# Patient Record
Sex: FEMALE | Race: WHITE | NOT HISPANIC OR LATINO | Employment: STUDENT | URBAN - METROPOLITAN AREA
[De-identification: names, ages, dates, MRNs, and addresses within clinical notes are randomized per-mention and may not be internally consistent; named-entity substitution may affect disease eponyms.]

---
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
## 2019-01-10 NOTE — TELEPHONE ENCOUNTER
Spoke to [facility] at [facility] - patient has a  In [facility] and they signed a order for her.

## 2019-01-10 NOTE — TELEPHONE ENCOUNTER
Patient's mom is Harassing [facility] in getting her the Alpha 1 treatment. We have not seen the patient since 10/2017. I have advised [place] of this but it seems like she is not following up with any provider at this point. I have not called patient or her mother at this point. Would you be willing to sign for 1 or 2 doses of Prolastin and I will let her know that she needs to establish with either new provider or come into see us.   Please Advise

## 2019-02-06 ENCOUNTER — OFFICE VISIT (OUTPATIENT)
Dept: URGENT CARE | Facility: CLINIC | Age: 19
End: 2019-02-06
Payer: COMMERCIAL

## 2019-02-06 ENCOUNTER — HOSPITAL ENCOUNTER (EMERGENCY)
Facility: MEDICAL CENTER | Age: 19
End: 2019-02-07
Attending: EMERGENCY MEDICINE
Payer: COMMERCIAL

## 2019-02-06 VITALS
SYSTOLIC BLOOD PRESSURE: 106 MMHG | WEIGHT: 117 LBS | OXYGEN SATURATION: 97 % | HEIGHT: 60 IN | DIASTOLIC BLOOD PRESSURE: 70 MMHG | HEART RATE: 80 BPM | BODY MASS INDEX: 22.97 KG/M2 | RESPIRATION RATE: 16 BRPM | TEMPERATURE: 97.6 F

## 2019-02-06 DIAGNOSIS — L23.3 ALLERGIC CONTACT DERMATITIS DUE TO DRUGS IN CONTACT WITH SKIN: ICD-10-CM

## 2019-02-06 DIAGNOSIS — L02.02 BOIL, FACE: ICD-10-CM

## 2019-02-06 PROCEDURE — 99203 OFFICE O/P NEW LOW 30 MIN: CPT | Performed by: FAMILY MEDICINE

## 2019-02-06 PROCEDURE — 99283 EMERGENCY DEPT VISIT LOW MDM: CPT

## 2019-02-06 RX ORDER — DOXYCYCLINE HYCLATE 100 MG
100 TABLET ORAL EVERY 12 HOURS
Qty: 14 TAB | Refills: 0 | Status: SHIPPED | OUTPATIENT
Start: 2019-02-06 | End: 2019-02-07 | Stop reason: CLARIF

## 2019-02-06 RX ORDER — CLOBETASOL PROPIONATE 0.5 MG/G
OINTMENT TOPICAL
Qty: 15 G | Refills: 0 | Status: SHIPPED | OUTPATIENT
Start: 2019-02-06 | End: 2019-02-07 | Stop reason: CLARIF

## 2019-02-06 RX ORDER — IBUPROFEN 800 MG/1
800 TABLET ORAL EVERY 8 HOURS PRN
Qty: 20 TAB | Refills: 3 | Status: SHIPPED | OUTPATIENT
Start: 2019-02-06 | End: 2019-02-07 | Stop reason: CLARIF

## 2019-02-06 ASSESSMENT — ENCOUNTER SYMPTOMS
CHILLS: 0
DIZZINESS: 0
SHORTNESS OF BREATH: 0
ORTHOPNEA: 0
FEVER: 0
HEMOPTYSIS: 0
FOCAL WEAKNESS: 0

## 2019-02-06 NOTE — PROGRESS NOTES
"Subjective:      Joan Jesus is a 19 y.o. female who presents with Nodule (x 4 days, bump on Rt. side of forehead, redness and swelling)      - This is a very pleasant, well and non-toxic appearing 19 y.o. female with complaints of tender pimple Rt side forehead x 4 days. Using otc cortisone and tried to pop squeeze it but not helping. No NVFC          ALLERGIES:  Iodine; Sulfa drugs; Fentanyl; Cefuroxime; Clindamycin; Morphine; Other food; Other misc; Pcn [penicillins]; and Prednisone     PMH:  Past Medical History:   Diagnosis Date   • Alpha-1-antitrypsin deficiency (HCC)    • Asthma    • Dental disorder     permanent retainer lower    • Food allergy     \"jalepenos make my lips and face swell\"   • Gynecological disorder     ovarian cysts   • Heart burn     resolved   • Hypoglycemia    • Seasonal allergies         PSH:  Past Surgical History:   Procedure Laterality Date   • TONSILLECTOMY AND ADENOIDECTOMY N/A 5/25/2018    Procedure: TONSILLECTOMY AND ADENOIDECTOMY;  Surgeon: Luiza Callahan M.D.;  Location: [facility];  Service: Ent   • ANTROSTOMY Bilateral 3/25/2016    Procedure: ANTROSTOMY - ENDOSCOPIC MAXILLARY W/FRONTAL SINUS EXPLORATION;  Surgeon: Luiza Callahan M.D.;  Location: [facility];  Service:    • ETHMOIDECTOMY Bilateral 3/25/2016    Procedure: ETHMOIDECTOMY - ENDOSCOPIC ;  Surgeon: Luiza Callahan M.D.;  Location: [facility];  Service:    • SPHENOIDECTOMY Bilateral 3/25/2016    Procedure: SPHENOIDOTOMY;  Surgeon: Luiza Callahan M.D.;  Location: [facility];  Service:    • TURBINOPLASTY Bilateral 3/25/2016    Procedure: TURBINOPLASTY;  Surgeon: Luiza Callahan M.D.;  Location: [facility];  Service:    • APPENDECTOMY CHILD  2013   • INGUINAL HERNIA REPAIR  2004       MEDS:    Current Outpatient Prescriptions:   •  doxycycline (VIBRAMYCIN) 100 MG Tab, Take 1 Tab by mouth every 12 hours for 7 days., Disp: 14 " Tab, Rfl: 0  •  clobetasol (TEMOVATE) 0.05 % Ointment, BID x 3 days, Disp: 15 g, Rfl: 0  •  ibuprofen (MOTRIN) 800 MG Tab, Take 1 Tab by mouth every 8 hours as needed., Disp: 20 Tab, Rfl: 3  •  etonogestrel (NEXPLANON) 68 MG Implant implant, Inject 1 Each as instructed Once., Disp: , Rfl:   •  ipratropium (ATROVENT) 17 MCG/ACT Aero Soln, Inhale 2 Puffs by mouth every 6 hours. (Patient taking differently: Inhale 2 Puffs by mouth as needed.), Disp: 17 g, Rfl: 3  •  budesonide-formoterol (SYMBICORT) 160-4.5 MCG/ACT Aerosol, Inhale 2 Puffs by mouth 2 Times a Day., Disp: , Rfl:   •  montelukast (SINGULAIR) 10 MG Tab, Take 10 mg by mouth every evening., Disp: , Rfl:   •  cetirizine (ZYRTEC) 10 MG Tab, Take 10 mg by mouth every day., Disp: , Rfl:   •  fluticasone (FLONASE) 50 MCG/ACT nasal spray, Spray 1 Spray in nose as needed., Disp: , Rfl:   •  levalbuterol (XOPENEX) 1.25 MG/3ML Nebu Soln, 1.25 mg by Nebulization route every four hours as needed for Shortness of Breath., Disp: , Rfl:   •  alpha-1-proteinase inhibitor (PROLASTIN) 500 MG Recon Soln, by Intravenous route every 7 days., Disp: , Rfl:     ** I have documented what I find to be significant in regards to past medical, social, family and surgical history  in my HPI or under PMH/PSH/FH review section, otherwise it is contributory **           HPI    Review of Systems   Constitutional: Negative for chills and fever.   Respiratory: Negative for hemoptysis and shortness of breath.    Cardiovascular: Negative for chest pain and orthopnea.   Skin:        Cyst forehead   Neurological: Negative for dizziness and focal weakness.          Objective:     /70   Pulse 80   Temp 36.4 °C (97.6 °F)   Resp 16   Ht 1.524 m (5')   Wt 53.1 kg (117 lb)   SpO2 97%   BMI 22.85 kg/m²      Physical Exam   Constitutional: She appears well-developed. No distress.   HENT:   Head: Normocephalic and atraumatic.   Mouth/Throat: Oropharynx is clear and moist.   Eyes: Conjunctivae  are normal.   Neck: Neck supple.   Cardiovascular: Regular rhythm.    No murmur heard.  Pulmonary/Chest: Effort normal. No respiratory distress.   Neurological: She is alert. She exhibits normal muscle tone.   Skin: Skin is warm and dry.   Red tender raised papule Rt side forehead. No fluctuance or head to rafaela/drain   Psychiatric: She has a normal mood and affect. Judgment normal.   Nursing note and vitals reviewed.              Assessment/Plan:         1. Boil, face  doxycycline (VIBRAMYCIN) 100 MG Tab    clobetasol (TEMOVATE) 0.05 % Ointment    ibuprofen (MOTRIN) 800 MG Tab             Dx & d/c instructions discussed w/ patient and/or family members.     ER precautions (worsening signs symptoms and when to go to ER) discussed.    Follow up w/ PCP in 2-3 days to make sure symptoms improving and no further intervention/treatment and/or work-up needed was advised, ER if feeling worse or not improving in 2 days.    Possible side effects (i.e. Rash, GI upset/constipation, sedation, elevation of BP or sugars) of any medications given discussed.     Patient left in stable condition

## 2019-02-07 ENCOUNTER — HOSPITAL ENCOUNTER (INPATIENT)
Facility: MEDICAL CENTER | Age: 19
LOS: 2 days | DRG: 603 | End: 2019-02-09
Attending: EMERGENCY MEDICINE | Admitting: INTERNAL MEDICINE
Payer: COMMERCIAL

## 2019-02-07 ENCOUNTER — APPOINTMENT (OUTPATIENT)
Dept: RADIOLOGY | Facility: MEDICAL CENTER | Age: 19
DRG: 603 | End: 2019-02-07
Attending: EMERGENCY MEDICINE
Payer: COMMERCIAL

## 2019-02-07 ENCOUNTER — OFFICE VISIT (OUTPATIENT)
Dept: URGENT CARE | Facility: CLINIC | Age: 19
End: 2019-02-07
Payer: COMMERCIAL

## 2019-02-07 VITALS
BODY MASS INDEX: 22.51 KG/M2 | WEIGHT: 114.64 LBS | SYSTOLIC BLOOD PRESSURE: 117 MMHG | DIASTOLIC BLOOD PRESSURE: 74 MMHG | HEART RATE: 87 BPM | HEIGHT: 60 IN | TEMPERATURE: 98.1 F | RESPIRATION RATE: 15 BRPM | OXYGEN SATURATION: 97 %

## 2019-02-07 VITALS
BODY MASS INDEX: 22.58 KG/M2 | WEIGHT: 115 LBS | SYSTOLIC BLOOD PRESSURE: 110 MMHG | HEIGHT: 60 IN | HEART RATE: 72 BPM | DIASTOLIC BLOOD PRESSURE: 64 MMHG | OXYGEN SATURATION: 95 % | RESPIRATION RATE: 16 BRPM | TEMPERATURE: 98.3 F

## 2019-02-07 DIAGNOSIS — T78.40XA ALLERGIC REACTION TO DRUG, INITIAL ENCOUNTER: ICD-10-CM

## 2019-02-07 DIAGNOSIS — L02.91 ABSCESS: ICD-10-CM

## 2019-02-07 PROBLEM — L03.211 FACIAL CELLULITIS: Status: ACTIVE | Noted: 2019-02-07

## 2019-02-07 LAB
ALBUMIN SERPL BCP-MCNC: 4.5 G/DL (ref 3.2–4.9)
ALBUMIN/GLOB SERPL: 1.6 G/DL
ALP SERPL-CCNC: 46 U/L (ref 30–99)
ALT SERPL-CCNC: 11 U/L (ref 2–50)
ANION GAP SERPL CALC-SCNC: 7 MMOL/L (ref 0–11.9)
AST SERPL-CCNC: 14 U/L (ref 12–45)
BASOPHILS # BLD AUTO: 0.3 % (ref 0–1.8)
BASOPHILS # BLD: 0.03 K/UL (ref 0–0.12)
BILIRUB SERPL-MCNC: 0.4 MG/DL (ref 0.1–1.5)
BUN SERPL-MCNC: 13 MG/DL (ref 8–22)
CALCIUM SERPL-MCNC: 9.7 MG/DL (ref 8.5–10.5)
CHLORIDE SERPL-SCNC: 108 MMOL/L (ref 96–112)
CO2 SERPL-SCNC: 22 MMOL/L (ref 20–33)
CREAT SERPL-MCNC: 0.81 MG/DL (ref 0.5–1.4)
EOSINOPHIL # BLD AUTO: 0.1 K/UL (ref 0–0.51)
EOSINOPHIL NFR BLD: 1.1 % (ref 0–6.9)
ERYTHROCYTE [DISTWIDTH] IN BLOOD BY AUTOMATED COUNT: 43.4 FL (ref 35.9–50)
GLOBULIN SER CALC-MCNC: 2.8 G/DL (ref 1.9–3.5)
GLUCOSE SERPL-MCNC: 88 MG/DL (ref 65–99)
HCT VFR BLD AUTO: 44.3 % (ref 37–47)
HGB BLD-MCNC: 14.9 G/DL (ref 12–16)
IMM GRANULOCYTES # BLD AUTO: 0.02 K/UL (ref 0–0.11)
IMM GRANULOCYTES NFR BLD AUTO: 0.2 % (ref 0–0.9)
LACTATE BLD-SCNC: 0.9 MMOL/L (ref 0.5–2)
LYMPHOCYTES # BLD AUTO: 2.2 K/UL (ref 1–4.8)
LYMPHOCYTES NFR BLD: 24.5 % (ref 22–41)
MCH RBC QN AUTO: 30.5 PG (ref 27–33)
MCHC RBC AUTO-ENTMCNC: 33.6 G/DL (ref 33.6–35)
MCV RBC AUTO: 90.8 FL (ref 81.4–97.8)
MONOCYTES # BLD AUTO: 0.57 K/UL (ref 0–0.85)
MONOCYTES NFR BLD AUTO: 6.4 % (ref 0–13.4)
NEUTROPHILS # BLD AUTO: 6.05 K/UL (ref 2–7.15)
NEUTROPHILS NFR BLD: 67.5 % (ref 44–72)
NRBC # BLD AUTO: 0 K/UL
NRBC BLD-RTO: 0 /100 WBC
PLATELET # BLD AUTO: 233 K/UL (ref 164–446)
PMV BLD AUTO: 11.1 FL (ref 9–12.9)
POTASSIUM SERPL-SCNC: 3.8 MMOL/L (ref 3.6–5.5)
PROT SERPL-MCNC: 7.3 G/DL (ref 6–8.2)
RBC # BLD AUTO: 4.88 M/UL (ref 4.2–5.4)
SODIUM SERPL-SCNC: 137 MMOL/L (ref 135–145)
WBC # BLD AUTO: 9 K/UL (ref 4.8–10.8)

## 2019-02-07 PROCEDURE — 80053 COMPREHEN METABOLIC PANEL: CPT

## 2019-02-07 PROCEDURE — 87040 BLOOD CULTURE FOR BACTERIA: CPT

## 2019-02-07 PROCEDURE — 0H91XZZ DRAINAGE OF FACE SKIN, EXTERNAL APPROACH: ICD-10-PCS | Performed by: SURGERY

## 2019-02-07 PROCEDURE — 700111 HCHG RX REV CODE 636 W/ 250 OVERRIDE (IP): Performed by: HOSPITALIST

## 2019-02-07 PROCEDURE — 700111 HCHG RX REV CODE 636 W/ 250 OVERRIDE (IP): Performed by: EMERGENCY MEDICINE

## 2019-02-07 PROCEDURE — 99223 1ST HOSP IP/OBS HIGH 75: CPT | Performed by: INTERNAL MEDICINE

## 2019-02-07 PROCEDURE — 770006 HCHG ROOM/CARE - MED/SURG/GYN SEMI*

## 2019-02-07 PROCEDURE — 83605 ASSAY OF LACTIC ACID: CPT

## 2019-02-07 PROCEDURE — 76882 US LMTD JT/FCL EVL NVASC XTR: CPT | Mod: RT

## 2019-02-07 PROCEDURE — 700102 HCHG RX REV CODE 250 W/ 637 OVERRIDE(OP): Performed by: INTERNAL MEDICINE

## 2019-02-07 PROCEDURE — 700105 HCHG RX REV CODE 258: Performed by: EMERGENCY MEDICINE

## 2019-02-07 PROCEDURE — 96365 THER/PROPH/DIAG IV INF INIT: CPT

## 2019-02-07 PROCEDURE — A9270 NON-COVERED ITEM OR SERVICE: HCPCS | Performed by: INTERNAL MEDICINE

## 2019-02-07 PROCEDURE — 36415 COLL VENOUS BLD VENIPUNCTURE: CPT

## 2019-02-07 PROCEDURE — 99214 OFFICE O/P EST MOD 30 MIN: CPT | Performed by: NURSE PRACTITIONER

## 2019-02-07 PROCEDURE — 99285 EMERGENCY DEPT VISIT HI MDM: CPT

## 2019-02-07 PROCEDURE — A9270 NON-COVERED ITEM OR SERVICE: HCPCS | Performed by: EMERGENCY MEDICINE

## 2019-02-07 PROCEDURE — 96375 TX/PRO/DX INJ NEW DRUG ADDON: CPT

## 2019-02-07 PROCEDURE — 700105 HCHG RX REV CODE 258: Performed by: INTERNAL MEDICINE

## 2019-02-07 PROCEDURE — 85025 COMPLETE CBC W/AUTO DIFF WBC: CPT

## 2019-02-07 PROCEDURE — 700111 HCHG RX REV CODE 636 W/ 250 OVERRIDE (IP): Performed by: INTERNAL MEDICINE

## 2019-02-07 PROCEDURE — 700102 HCHG RX REV CODE 250 W/ 637 OVERRIDE(OP): Performed by: EMERGENCY MEDICINE

## 2019-02-07 RX ORDER — AMOXICILLIN 250 MG
2 CAPSULE ORAL 2 TIMES DAILY
Status: DISCONTINUED | OUTPATIENT
Start: 2019-02-08 | End: 2019-02-09 | Stop reason: HOSPADM

## 2019-02-07 RX ORDER — DIPHENHYDRAMINE HCL 25 MG
25 TABLET ORAL EVERY 8 HOURS
Status: DISCONTINUED | OUTPATIENT
Start: 2019-02-07 | End: 2019-02-09 | Stop reason: HOSPADM

## 2019-02-07 RX ORDER — ACETAMINOPHEN 325 MG/1
650 TABLET ORAL EVERY 6 HOURS PRN
Status: DISCONTINUED | OUTPATIENT
Start: 2019-02-07 | End: 2019-02-09 | Stop reason: HOSPADM

## 2019-02-07 RX ORDER — BISACODYL 10 MG
10 SUPPOSITORY, RECTAL RECTAL
Status: DISCONTINUED | OUTPATIENT
Start: 2019-02-07 | End: 2019-02-09 | Stop reason: HOSPADM

## 2019-02-07 RX ORDER — CETIRIZINE HYDROCHLORIDE 10 MG/1
10 TABLET ORAL 2 TIMES DAILY
Status: DISCONTINUED | OUTPATIENT
Start: 2019-02-07 | End: 2019-02-09 | Stop reason: HOSPADM

## 2019-02-07 RX ORDER — POLYETHYLENE GLYCOL 3350 17 G/17G
1 POWDER, FOR SOLUTION ORAL
Status: DISCONTINUED | OUTPATIENT
Start: 2019-02-07 | End: 2019-02-09 | Stop reason: HOSPADM

## 2019-02-07 RX ORDER — DIPHENHYDRAMINE HYDROCHLORIDE 50 MG/ML
25 INJECTION INTRAMUSCULAR; INTRAVENOUS ONCE
Status: COMPLETED | OUTPATIENT
Start: 2019-02-07 | End: 2019-02-07

## 2019-02-07 RX ORDER — FLUTICASONE PROPIONATE 50 MCG
1 SPRAY, SUSPENSION (ML) NASAL 2 TIMES DAILY PRN
Status: DISCONTINUED | OUTPATIENT
Start: 2019-02-07 | End: 2019-02-09 | Stop reason: HOSPADM

## 2019-02-07 RX ORDER — BUDESONIDE AND FORMOTEROL FUMARATE DIHYDRATE 160; 4.5 UG/1; UG/1
2 AEROSOL RESPIRATORY (INHALATION) 2 TIMES DAILY
Status: DISCONTINUED | OUTPATIENT
Start: 2019-02-07 | End: 2019-02-09 | Stop reason: HOSPADM

## 2019-02-07 RX ORDER — DIPHENHYDRAMINE HCL 25 MG
25 TABLET ORAL
COMMUNITY
End: 2019-10-17

## 2019-02-07 RX ORDER — MOXIFLOXACIN HYDROCHLORIDE 400 MG/1
400 TABLET ORAL DAILY
Qty: 7 TAB | Refills: 0 | Status: SHIPPED | OUTPATIENT
Start: 2019-02-07 | End: 2019-02-07 | Stop reason: CLARIF

## 2019-02-07 RX ORDER — IBUPROFEN 600 MG/1
600 TABLET ORAL ONCE
Status: COMPLETED | OUTPATIENT
Start: 2019-02-07 | End: 2019-02-07

## 2019-02-07 RX ORDER — DIPHENHYDRAMINE HCL 25 MG
25 TABLET ORAL
Status: DISCONTINUED | OUTPATIENT
Start: 2019-02-07 | End: 2019-02-09 | Stop reason: HOSPADM

## 2019-02-07 RX ORDER — MONTELUKAST SODIUM 10 MG/1
10 TABLET ORAL EVERY EVENING
Status: DISCONTINUED | OUTPATIENT
Start: 2019-02-07 | End: 2019-02-09 | Stop reason: HOSPADM

## 2019-02-07 RX ORDER — LEVALBUTEROL INHALATION SOLUTION 1.25 MG/3ML
1.25 SOLUTION RESPIRATORY (INHALATION) EVERY 4 HOURS PRN
Status: DISCONTINUED | OUTPATIENT
Start: 2019-02-07 | End: 2019-02-09 | Stop reason: HOSPADM

## 2019-02-07 RX ORDER — DIPHENHYDRAMINE HYDROCHLORIDE 50 MG/ML
25 INJECTION INTRAMUSCULAR; INTRAVENOUS EVERY 8 HOURS
Status: DISCONTINUED | OUTPATIENT
Start: 2019-02-07 | End: 2019-02-07

## 2019-02-07 RX ORDER — DOXYCYCLINE HYCLATE 100 MG
100 TABLET ORAL EVERY 12 HOURS
Status: ON HOLD | COMMUNITY
Start: 2019-02-06 | End: 2019-02-09

## 2019-02-07 RX ADMIN — VANCOMYCIN HYDROCHLORIDE 1300 MG: 100 INJECTION, POWDER, LYOPHILIZED, FOR SOLUTION INTRAVENOUS at 15:55

## 2019-02-07 RX ADMIN — VANCOMYCIN HYDROCHLORIDE 1300 MG: 100 INJECTION, POWDER, LYOPHILIZED, FOR SOLUTION INTRAVENOUS at 22:03

## 2019-02-07 RX ADMIN — MONTELUKAST SODIUM 10 MG: 10 TABLET, FILM COATED ORAL at 22:00

## 2019-02-07 RX ADMIN — DIPHENHYDRAMINE HYDROCHLORIDE 25 MG: 50 INJECTION INTRAMUSCULAR; INTRAVENOUS at 16:50

## 2019-02-07 RX ADMIN — DIPHENHYDRAMINE HYDROCHLORIDE 25 MG: 50 INJECTION INTRAMUSCULAR; INTRAVENOUS at 22:01

## 2019-02-07 RX ADMIN — IBUPROFEN 600 MG: 600 TABLET ORAL at 16:01

## 2019-02-07 ASSESSMENT — ENCOUNTER SYMPTOMS
HEARTBURN: 0
DIZZINESS: 0
CONSTIPATION: 0
HEADACHES: 0
ABDOMINAL PAIN: 0
FALLS: 0
FEVER: 0
FEVER: 0
EYE PAIN: 0
TINGLING: 0
SHORTNESS OF BREATH: 0
SPEECH CHANGE: 0
TREMORS: 0
CHILLS: 0
WEIGHT LOSS: 0
SEIZURES: 0
CHILLS: 0
WHEEZING: 0
NAUSEA: 0
NECK PAIN: 0
SENSORY CHANGE: 0
DEPRESSION: 0
NAUSEA: 0
SHORTNESS OF BREATH: 0
WHEEZING: 0
VOMITING: 0
WEAKNESS: 0
COUGH: 0
SPUTUM PRODUCTION: 0
DIARRHEA: 0
BLURRED VISION: 0
PND: 0
VOMITING: 0
PALPITATIONS: 0
BACK PAIN: 0

## 2019-02-07 ASSESSMENT — LIFESTYLE VARIABLES
SUBSTANCE_ABUSE: 0
EVER_SMOKED: NEVER

## 2019-02-07 ASSESSMENT — PATIENT HEALTH QUESTIONNAIRE - PHQ9
2. FEELING DOWN, DEPRESSED, IRRITABLE, OR HOPELESS: NOT AT ALL
SUM OF ALL RESPONSES TO PHQ9 QUESTIONS 1 AND 2: 0
1. LITTLE INTEREST OR PLEASURE IN DOING THINGS: NOT AT ALL

## 2019-02-07 NOTE — ED TRIAGE NOTES
Pt ambulatory to triage. Pt seen here last night for the same. Pt states she was told to take benadryl and sent home. When pt woke up her eyes were swollen shut and facial swelling hadnt improved. Pt went to Dr. Cummings (Asthma and Allergy specialist) this morning and was told to come here for generalized skin infection. Pt has abscess above right eye and MD wants f/u imaging. Pt states she has had right eye visual changes and has a headache.     Chief Complaint   Patient presents with   • Wound Infection   • Sent by MD   • Blurred Vision     [facility] process. Pt educated to notified RN or triage tech if changes in condition.

## 2019-02-07 NOTE — ED NOTES
Pt ambulated from waiting room without difficulty, connected to monitor, PIV established and blood sent to lab, agree with triage note. ERP at bedside for eval

## 2019-02-07 NOTE — ED PROVIDER NOTES
"ED Provider Note    ED Provider Note      Primary care provider: Marcos Dunlap M.D.    CHIEF COMPLAINT  Chief Complaint   Patient presents with   • Facial Swelling     \"I developed a cyst on my forehead, they gave me a cream for it which I started this afternoon (clobetasol), I washed it off around 1900 but my face has continued to swell.\" Swelling noted to forehead, bridge of nose, and bilateral eyelids.        HPI  Joan Jesus is a 19 y.o. female who presents to the Emergency Department with chief complaint of facial swelling.  Patient was seen in urgent treatment center earlier in the day her head.  She was diagnosed with a cyst was placed on doxycycline and clobetasol.  Patient stated that after using the clobetasol she noticed immediate swelling over her forehead and some swelling into bilateral eyes.  She has had no shortness of breath no difficulty swallowing breathing no other changes in her skin no other acute symptoms or concerns.    REVIEW OF SYSTEMS  Positive for mild facial swelling negative for any pruritus shortness of breath difficulty swallowing or breathing    PAST MEDICAL HISTORY   has a past medical history of Alpha-1-antitrypsin deficiency (HCC); Asthma; Dental disorder; Food allergy; Gynecological disorder; Heart burn; Hypoglycemia; and Seasonal allergies.    SURGICAL HISTORY   has a past surgical history that includes inguinal hernia repair (2004); appendectomy child (2013); antrostomy (Bilateral, 3/25/2016); ethmoidectomy (Bilateral, 3/25/2016); sphenoidectomy (Bilateral, 3/25/2016); turbinoplasty (Bilateral, 3/25/2016); and tonsillectomy and adenoidectomy (N/A, 5/25/2018).    SOCIAL HISTORY  Social History   Substance Use Topics   • Smoking status: Never Smoker   • Smokeless tobacco: Never Used   • Alcohol use No      History   Drug Use No       FAMILY HISTORY  Non-Contributory    CURRENT MEDICATIONS  Home Medications    **Home medications have not yet been reviewed for this " encounter**         ALLERGIES  Allergies   Allergen Reactions   • Iodine Anaphylaxis   • Sulfa Drugs Anaphylaxis   • Fentanyl Vomiting   • Cefuroxime Hives   • Clindamycin Hives   • Morphine Nausea     Severe nausea   • Other Food      Gluten intolerance   • Other Misc Rash     Electrode pads   • Pcn [Penicillins] Hives   • Prednisone Hives       PHYSICAL EXAM  VITAL SIGNS: /74   Pulse 88   Temp 36.7 °C (98.1 °F) (Temporal)   Resp 16   Ht 1.524 m (5')   Wt 52 kg (114 lb 10.2 oz)   SpO2 99%   BMI 22.39 kg/m²   Pulse ox interpretation: I interpret this pulse ox as normal.  Constitutional: Alert and oriented x 3, minimal distress  HEENT: Atraumatic moderate edema over the right forehead.  Essentially in the edema patient has 1 cm erythematous indurated lesion with slight central skin breakdown and evidence of spontaneous drainage no fluctuance pupils are equal round reactive to light extraocular movements are intact. The nares is clear, external ears are normal, mouth shows moist mucous membranes  Neck: Supple, no JVD no tracheal deviation  Cardiovascular: Regular rate and rhythm no murmur rub or gallop 2+ pulses peripherally x4  Thorax & Lungs: No respiratory distress, no wheezes rales or rhonchi, No chest tenderness.   GI: Soft nontender nondistended positive bowel sounds, no peritoneal signs  Skin: Warm dry no acute rash or lesion  Musculoskeletal: Moving all extremities with full range and 5 of 5 strength, no acute  deformity  Neurologic: Cranial nerves III through XII are grossly intact, no sensory deficit, no cerebellar dysfunction   Psychiatric: Appropriate affect for situation at this time      DIAGNOSTIC STUDIES / PROCEDURES    COURSE & MEDICAL DECISION MAKING  Pertinent Labs & Imaging studies reviewed. (See chart for details)    11:34 PM - Patient seen and examined at bedside.  Patient appears to have very localized reaction to topical clobetasol.  Patient given instructions to discontinue use of  this med to continue to use her doxycycline as I feels of the lesion on her head is most likely a small abscess that is now spontaneously drained.  Return for any further swelling any shortness breath difficulty swallowing breathing other skin changes any other acute symptoms or concerns otherwise discharged in stable condition.        Patient noted to have slightly elevated blood pressure likely circumstantial secondary to presenting complaint. Referred to primary care physician for further evaluation.        /74   Pulse 88   Temp 36.7 °C (98.1 °F) (Temporal)   Resp 16   Ht 1.524 m (5')   Wt 52 kg (114 lb 10.2 oz)   SpO2 99%   BMI 22.39 kg/m²     Marcos Dunlap M.D.  [address] [facility] [id number] [phone]    In 2 days      [facility], Emergency Dept  [facility] [facility] [address] [phone]    If symptoms worsen      Discharge Medication List as of 2/6/2019 11:42 PM          FINAL IMPRESSION  1. Allergic contact dermatitis due to drugs in contact with skin        This dictation has been created using voice recognition software and/or scribes. The accuracy of the dictation is limited by the abilities of the software and the expertise of the scribes. I expect there may be some errors of grammar and possibly content. I made every attempt to manually correct the errors within my dictation. However, errors related to voice recognition software and/or scribes may still exist and should be interpreted within the appropriate context.

## 2019-02-07 NOTE — ED TRIAGE NOTES
".  Chief Complaint   Patient presents with   • Wound Infection   • Sent by MD   • Blurred Vision   Agree with triage assessment.  Onset Sunday, \" kept getting bigger, my forehead is throbbing in pain. \"   + forehead erythema/edema noted.  Pt reports taking Doxycycline and Benadryl yesterday.  No difficulty breathing.      "

## 2019-02-07 NOTE — ED PROVIDER NOTES
ED Provider Note    Scribed for Justin Murphy M.D. by Maggie Varma. 2/7/2019, 2:04 PM.    Primary care provider: Marcos Dunlap M.D.  Means of arrival: Walk-In  History obtained from: Patient  History limited by: None    CHIEF COMPLAINT  Chief Complaint   Patient presents with   • Wound Infection   • Sent by MD   • Blurred Vision     HPI  Joan Jesus is a 19 y.o. female who presents to the Emergency Department complaining of right sided facial swelling onset 1 day ago. She has an abscess to the right side of her face that she has had for the past 5 days that has only progressively gotten worse now causing the left side of her face start to swell. Associated symptoms include purulent drainage, right eye pain, headache, vision changes, chills. She also endorses dizziness yesterday that has since resolved. She was seen at Urgent Care yesterday for these symptoms and was given antibiotics which she has been taking. Patient also saw a nurse practitioner yesterday after urgent care and was told this was an allergic reaction. She saw her allergist this morning who told her this is not an allergic reaction and to be seen by the ED for infection. Denies fever.    REVIEW OF SYSTEMS  Pertinent positives include abscess, vision changes, drainage, facial swelling. Pertinent negatives include no fever.  As above, all other systems reviewed and are negative.   See HPI for further details.     PAST MEDICAL HISTORY   has a past medical history of Alpha-1-antitrypsin deficiency (HCC); Asthma; Dental disorder; Food allergy; Gynecological disorder; Heart burn; Hypoglycemia; and Seasonal allergies.    SURGICAL HISTORY   has a past surgical history that includes inguinal hernia repair (2004); appendectomy child (2013); antrostomy (Bilateral, 3/25/2016); ethmoidectomy (Bilateral, 3/25/2016); sphenoidectomy (Bilateral, 3/25/2016); turbinoplasty (Bilateral, 3/25/2016); and tonsillectomy and adenoidectomy (N/A,  5/25/2018).    SOCIAL HISTORY  Social History   Substance Use Topics   • Smoking status: Never Smoker   • Smokeless tobacco: Never Used   • Alcohol use No      History   Drug Use No       FAMILY HISTORY  Family History   Problem Relation Age of Onset   • Hypertension Mother    • Other Mother    • Asthma Father        CURRENT MEDICATIONS  Home Medications     Reviewed by Corina Rider PhT (Pharmacy Tech) on 02/07/19 at 1452  Med List Status: Complete   Medication Last Dose Status   alpha-1-proteinase inhibitor (PROLASTIN) 500 MG Recon Soln 1/30/2019 Active   budesonide-formoterol (SYMBICORT) 160-4.5 MCG/ACT Aerosol 2/7/2019 Active   cetirizine (ZYRTEC) 10 MG Tab 2/7/2019 Active   diphenhydrAMINE (BENADRYL) 25 MG Tab 2/6/2019 Active   doxycycline (VIBRAMYCIN) 100 MG Tab 2/6/2019 Active   etonogestrel (NEXPLANON) 68 MG Implant implant 10/2018 Active   fluticasone (FLONASE) 50 MCG/ACT nasal spray UNK Active   ipratropium (ATROVENT) 17 MCG/ACT Aero Soln UNK Active   levalbuterol (XOPENEX) 1.25 MG/3ML Nebu Soln UNK Active   montelukast (SINGULAIR) 10 MG Tab 2/5/2019 Active                ALLERGIES  Allergies   Allergen Reactions   • Iodine Anaphylaxis   • Sulfa Drugs Anaphylaxis   • Fentanyl Vomiting   • Cefuroxime Hives   • Clindamycin Hives   • Gluten Meal Diarrhea     Blisters     • Morphine Nausea     Severe nausea   • Other Misc Rash     Electrode pads   • Pcn [Penicillins] Hives   • Prednisone Hives       PHYSICAL EXAM  VITAL SIGNS: /68   Pulse 80   Temp 35.9 °C (96.6 °F) (Temporal)   Resp 16   Ht 1.524 m (5')   Wt 52.2 kg (115 lb 1.3 oz)   SpO2 95%   BMI 22.48 kg/m²   Vitals reviewed.    Constitutional: Alert in no apparent distress.  HENT: Tender lesion to the right forehead with no fluctuance. Erythema and swelling of the face around her eyes.No signs of trauma, Bilateral external ears normal, Nose normal.   Eyes: Pupils are equal and reactive, Conjunctiva normal, Non-icteric.   Neck: Normal  "range of motion, No tenderness, Supple, No stridor.   Lymphatic: No lymphadenopathy noted.   Cardiovascular: Regular rate and rhythm, no murmurs.   Thorax & Lungs: Normal breath sounds, No respiratory distress, No wheezing, No chest tenderness.   Abdomen: Bowel sounds normal, Soft, No tenderness, No peritoneal signs, No masses, No pulsatile masses.   Skin:  Warm, Dry, No rash.   Back: No bony tenderness, No CVA tenderness.   Extremities: Intact distal pulses, No edema, No tenderness, No cyanosis  Musculoskeletal: Good range of motion in all major joints. No tenderness to palpation or major deformities noted.   Neurologic: Alert , Normal motor function, Normal sensory function, No focal deficits noted.   Psychiatric: Affect normal, Judgment normal, Mood normal.       DIAGNOSTIC STUDIES / PROCEDURES    LABS  Labs Reviewed   CBC WITH DIFFERENTIAL   LACTIC ACID   COMP METABOLIC PANEL   BLOOD CULTURE    Narrative:     1 of 2 for Blood Culture x 2 sites order. Per Hospital  Policy: Only change Specimen Src: to \"Line\" if specified by  physician order.   BLOOD CULTURE    Narrative:     2 of 2 blood culture x2  Sites order. Per Hospital Policy:  Only change Specimen Src: to \"Line\" if specified by physician  order.   ESTIMATED GFR      All labs reviewed by me.    RADIOLOGY  US-EXTREMITY NON VASCULAR UNILATERAL RIGHT   Final Result      7 mm sebaceous cyst.        The radiologist's interpretation of all radiological studies have been reviewed by me.    COURSE & MEDICAL DECISION MAKING  Nursing notes, VS, PMSFHx reviewed in chart.  Differential diagnoses include but not limited to: abscess vs cellulitis.      2:32 PM - Patient seen and examined at bedside. Discussed with the patient that this does appear to be an infection. I am concerned for cellulitis. Plan includes to order blood work and US to further evaluate. Further addressed any questions and concerns. Ordered for US-extremity, CBC with differential, CMP, lactic acid, " and blood culture to evaluate.     2:46 PM - Consulted with pharmacy and we agree to treat the patient with Vancomycin.     3:34 PM - Consulted with Dr. Dang (Hospitalist) who accepts the patient for admission.     3:51 PM -I spoke with Dr. Nelson who is on for plastic surgery who will consult on the patient, her ultrasound reveals a 7 mm infected sebaceous cyst.      DISPOSITION:  Patient will be admitted to Dr. Dang (Hospitalist) in fair condition.      FINAL IMPRESSION  Face abscess  Preseptal cellulitis     Maggie CANO (Nicole), am scribing for, and in the presence of, Justin Murphy M.D..    Electronically signed by: Maggie Varma (Nicole), 2/7/2019    IJustin M.D. personally performed the services described in this documentation, as scribed by Maggie Varma in my presence, and it is both accurate and complete. C    The note accurately reflects work and decisions made by me.  Justin Murphy  2/7/2019  3:52 PM

## 2019-02-07 NOTE — ED TRIAGE NOTES
"Joan Jesus  19 y.o. female  Chief Complaint   Patient presents with   • Facial Swelling     \"I developed a cyst on my forehead, they gave me a cream for it which I started this afternoon (clobetasol), I washed it off around 1900 but my face has continued to swell.\" Swelling noted to forehead, bridge of nose, and bilateral eyelids.        Pt amb to triage with steady gait for above complaint. P    Pt is alert and oriented, speaking in full sentences, follows commands and responds appropriately to questions. Resp are even and unlabored. No behavioral indicators of pain.     Pt placed in lobby. Pt educated on triage process. Pt encouraged to alert staff for any changes.    "

## 2019-02-07 NOTE — ED NOTES
Med Rec Updated and Complete per Pt at bedside with permission to do so in front of family/visitor  Allergies Reviewed and Updated    Pt started a 7-day course of Doxycycline 100mg every 12 hours on 02/06/19 Due to End 02/12/19.    Pt gets Prolastin Injections every 7 days either on Wednesday or Fridays depending on the Pt's preference.    Pt had a Nexplanon Implant placed in October 2018.

## 2019-02-07 NOTE — PROGRESS NOTES
Subjective:      Joan Jesus is a 19 y.o. female who presents with Facial Swelling (facial swelling and redness x 1 day after using clobetesol)            HPI New problem. 19 year old female with facial swelling and rash following application of temovate yesterday. She was seen in our clinic for abscess to forehead and rx'd this as well as doxycycline. She did go to ED last night for this and was evaluated and told to take benadryl (allergic to steroids). She denies any shortness of breath with this. States painful. Took benadry with no improvement. She has increased redness and swelling periorbitally.   Iodine; Sulfa drugs; Fentanyl; Cefuroxime; Clindamycin; Morphine; Other food; Other misc; Pcn [penicillins]; and Prednisone  Current Outpatient Prescriptions on File Prior to Visit   Medication Sig Dispense Refill   • doxycycline (VIBRAMYCIN) 100 MG Tab Take 1 Tab by mouth every 12 hours for 7 days. 14 Tab 0   • clobetasol (TEMOVATE) 0.05 % Ointment BID x 3 days 15 g 0   • ibuprofen (MOTRIN) 800 MG Tab Take 1 Tab by mouth every 8 hours as needed. 20 Tab 3   • alpha-1-proteinase inhibitor (PROLASTIN) 500 MG Recon Soln by Intravenous route every 7 days.     • etonogestrel (NEXPLANON) 68 MG Implant implant Inject 1 Each as instructed Once.     • ipratropium (ATROVENT) 17 MCG/ACT Aero Soln Inhale 2 Puffs by mouth every 6 hours. (Patient taking differently: Inhale 2 Puffs by mouth as needed.) 17 g 3   • budesonide-formoterol (SYMBICORT) 160-4.5 MCG/ACT Aerosol Inhale 2 Puffs by mouth 2 Times a Day.     • montelukast (SINGULAIR) 10 MG Tab Take 10 mg by mouth every evening.     • cetirizine (ZYRTEC) 10 MG Tab Take 10 mg by mouth every day.     • fluticasone (FLONASE) 50 MCG/ACT nasal spray Spray 1 Spray in nose as needed.     • levalbuterol (XOPENEX) 1.25 MG/3ML Nebu Soln 1.25 mg by Nebulization route every four hours as needed for Shortness of Breath.       No current facility-administered medications on file  prior to visit.      Social History     Social History   • Marital status: Single     Spouse name: N/A   • Number of children: N/A   • Years of education: N/A     Occupational History   • Not on file.     Social History Main Topics   • Smoking status: Never Smoker   • Smokeless tobacco: Never Used   • Alcohol use No   • Drug use: No   • Sexual activity: Not on file     Other Topics Concern   • Not on file     Social History Narrative   • No narrative on file     family history includes Asthma in her father; Hypertension in her mother; Other in her mother.      Review of Systems   Constitutional: Negative for chills, fever and malaise/fatigue.   Respiratory: Negative for shortness of breath and wheezing.    Gastrointestinal: Negative for nausea and vomiting.   Skin: Positive for itching and rash.   Neurological: Negative for tingling and sensory change.          Objective:     /64 (BP Location: Left arm, Patient Position: Sitting, BP Cuff Size: Adult)   Pulse 72   Temp 36.8 °C (98.3 °F) (Temporal)   Resp 16   Ht 1.524 m (5')   Wt 52.2 kg (115 lb)   SpO2 95%   BMI 22.46 kg/m²      Physical Exam   Constitutional: She is oriented to person, place, and time. She appears well-developed and well-nourished. No distress.   HENT:   Head: Normocephalic and atraumatic.   Right Ear: External ear and ear canal normal. Tympanic membrane is not injected and not perforated. No middle ear effusion.   Left Ear: External ear and ear canal normal. Tympanic membrane is not injected and not perforated.  No middle ear effusion.   Nose: Mucosal edema present.   Mouth/Throat: Posterior oropharyngeal erythema present. No oropharyngeal exudate.   Eyes: Conjunctivae are normal. Right eye exhibits no discharge. Left eye exhibits no discharge.   Neck: Normal range of motion. Neck supple.   Cardiovascular: Normal rate, regular rhythm and normal heart sounds.    No murmur heard.  Pulmonary/Chest: Effort normal and breath sounds normal.  No respiratory distress.   Musculoskeletal: Normal range of motion.   Normal movement of all 4 extremities.   Lymphadenopathy:     She has no cervical adenopathy.        Right: No supraclavicular adenopathy present.        Left: No supraclavicular adenopathy present.   Neurological: She is alert and oriented to person, place, and time. Gait normal.   Skin: Skin is warm and dry. Rash noted. There is erythema.   Scabbed lesion on forehead.   Psychiatric: She has a normal mood and affect. Her behavior is normal. Thought content normal.   Nursing note and vitals reviewed.              Assessment/Plan:     1. Allergic reaction to drug, initial encounter     2. Abscess  moxifloxacin (AVELOX) 400 MG Tab      stop both medications, switch to avelox.  Patient advised to continue benadryl and use cool compresses to rash.   Differential diagnosis, natural history, supportive care, and indications for immediate follow-up discussed at length.

## 2019-02-07 NOTE — ED NOTES
Pt verbalizes understanding of discharge and follow-up instructions.  PIV removed.  Given Rx X0.  VSS.  All questions answered.  Ambulates to discharge with steady gait.

## 2019-02-08 LAB
ANION GAP SERPL CALC-SCNC: 10 MMOL/L (ref 0–11.9)
BASOPHILS # BLD AUTO: 0.4 % (ref 0–1.8)
BASOPHILS # BLD: 0.03 K/UL (ref 0–0.12)
BUN SERPL-MCNC: 13 MG/DL (ref 8–22)
CALCIUM SERPL-MCNC: 8.4 MG/DL (ref 8.5–10.5)
CHLORIDE SERPL-SCNC: 109 MMOL/L (ref 96–112)
CO2 SERPL-SCNC: 22 MMOL/L (ref 20–33)
CREAT SERPL-MCNC: 0.86 MG/DL (ref 0.5–1.4)
EOSINOPHIL # BLD AUTO: 0.12 K/UL (ref 0–0.51)
EOSINOPHIL NFR BLD: 1.6 % (ref 0–6.9)
ERYTHROCYTE [DISTWIDTH] IN BLOOD BY AUTOMATED COUNT: 44 FL (ref 35.9–50)
GLUCOSE SERPL-MCNC: 78 MG/DL (ref 65–99)
HCT VFR BLD AUTO: 42.2 % (ref 37–47)
HGB BLD-MCNC: 13.9 G/DL (ref 12–16)
IMM GRANULOCYTES # BLD AUTO: 0.02 K/UL (ref 0–0.11)
IMM GRANULOCYTES NFR BLD AUTO: 0.3 % (ref 0–0.9)
LYMPHOCYTES # BLD AUTO: 2.25 K/UL (ref 1–4.8)
LYMPHOCYTES NFR BLD: 30.3 % (ref 22–41)
MCH RBC QN AUTO: 30.4 PG (ref 27–33)
MCHC RBC AUTO-ENTMCNC: 32.9 G/DL (ref 33.6–35)
MCV RBC AUTO: 92.3 FL (ref 81.4–97.8)
MONOCYTES # BLD AUTO: 0.59 K/UL (ref 0–0.85)
MONOCYTES NFR BLD AUTO: 8 % (ref 0–13.4)
NEUTROPHILS # BLD AUTO: 4.41 K/UL (ref 2–7.15)
NEUTROPHILS NFR BLD: 59.4 % (ref 44–72)
NRBC # BLD AUTO: 0 K/UL
NRBC BLD-RTO: 0 /100 WBC
PLATELET # BLD AUTO: 209 K/UL (ref 164–446)
PMV BLD AUTO: 11.4 FL (ref 9–12.9)
POTASSIUM SERPL-SCNC: 4 MMOL/L (ref 3.6–5.5)
RBC # BLD AUTO: 4.57 M/UL (ref 4.2–5.4)
SODIUM SERPL-SCNC: 141 MMOL/L (ref 135–145)
WBC # BLD AUTO: 7.4 K/UL (ref 4.8–10.8)

## 2019-02-08 PROCEDURE — A9270 NON-COVERED ITEM OR SERVICE: HCPCS | Performed by: INTERNAL MEDICINE

## 2019-02-08 PROCEDURE — A9270 NON-COVERED ITEM OR SERVICE: HCPCS | Performed by: FAMILY MEDICINE

## 2019-02-08 PROCEDURE — 700102 HCHG RX REV CODE 250 W/ 637 OVERRIDE(OP): Performed by: INTERNAL MEDICINE

## 2019-02-08 PROCEDURE — 770006 HCHG ROOM/CARE - MED/SURG/GYN SEMI*

## 2019-02-08 PROCEDURE — 700102 HCHG RX REV CODE 250 W/ 637 OVERRIDE(OP): Performed by: FAMILY MEDICINE

## 2019-02-08 PROCEDURE — 36415 COLL VENOUS BLD VENIPUNCTURE: CPT

## 2019-02-08 PROCEDURE — 700101 HCHG RX REV CODE 250: Performed by: HOSPITALIST

## 2019-02-08 PROCEDURE — 85025 COMPLETE CBC W/AUTO DIFF WBC: CPT

## 2019-02-08 PROCEDURE — 700111 HCHG RX REV CODE 636 W/ 250 OVERRIDE (IP): Performed by: HOSPITALIST

## 2019-02-08 PROCEDURE — 700105 HCHG RX REV CODE 258: Performed by: HOSPITALIST

## 2019-02-08 PROCEDURE — 80048 BASIC METABOLIC PNL TOTAL CA: CPT

## 2019-02-08 PROCEDURE — 99232 SBSQ HOSP IP/OBS MODERATE 35: CPT | Performed by: FAMILY MEDICINE

## 2019-02-08 RX ORDER — IBUPROFEN 400 MG/1
400 TABLET ORAL EVERY 6 HOURS PRN
Status: DISCONTINUED | OUTPATIENT
Start: 2019-02-08 | End: 2019-02-09 | Stop reason: HOSPADM

## 2019-02-08 RX ORDER — DIPHENHYDRAMINE HYDROCHLORIDE 50 MG/ML
25 INJECTION INTRAMUSCULAR; INTRAVENOUS ONCE
Status: ACTIVE | OUTPATIENT
Start: 2019-02-08 | End: 2019-02-09

## 2019-02-08 RX ORDER — DIPHENHYDRAMINE HYDROCHLORIDE 50 MG/ML
25 INJECTION INTRAMUSCULAR; INTRAVENOUS ONCE
Status: COMPLETED | OUTPATIENT
Start: 2019-02-08 | End: 2019-02-08

## 2019-02-08 RX ORDER — LINEZOLID 600 MG/1
600 TABLET, FILM COATED ORAL EVERY 12 HOURS
Status: DISCONTINUED | OUTPATIENT
Start: 2019-02-08 | End: 2019-02-09 | Stop reason: HOSPADM

## 2019-02-08 RX ADMIN — CETIRIZINE HYDROCHLORIDE 10 MG: 10 TABLET, FILM COATED ORAL at 21:00

## 2019-02-08 RX ADMIN — DIPHENHYDRAMINE HCL 25 MG: 25 TABLET ORAL at 14:26

## 2019-02-08 RX ADMIN — LINEZOLID 600 MG: 600 TABLET, FILM COATED ORAL at 17:13

## 2019-02-08 RX ADMIN — BUDESONIDE AND FORMOTEROL FUMARATE DIHYDRATE 2 PUFF: 160; 4.5 AEROSOL RESPIRATORY (INHALATION) at 09:48

## 2019-02-08 RX ADMIN — LINEZOLID 600 MG: 600 TABLET, FILM COATED ORAL at 09:45

## 2019-02-08 RX ADMIN — DIPHENHYDRAMINE HYDROCHLORIDE 25 MG: 50 INJECTION INTRAMUSCULAR; INTRAVENOUS at 02:20

## 2019-02-08 RX ADMIN — DIPHENHYDRAMINE HCL 25 MG: 25 TABLET ORAL at 22:41

## 2019-02-08 RX ADMIN — IBUPROFEN 400 MG: 400 TABLET ORAL at 14:26

## 2019-02-08 RX ADMIN — BUDESONIDE AND FORMOTEROL FUMARATE DIHYDRATE 2 PUFF: 160; 4.5 AEROSOL RESPIRATORY (INHALATION) at 19:58

## 2019-02-08 RX ADMIN — DIPHENHYDRAMINE HCL 25 MG: 25 TABLET ORAL at 09:45

## 2019-02-08 RX ADMIN — MONTELUKAST SODIUM 10 MG: 10 TABLET, FILM COATED ORAL at 19:58

## 2019-02-08 RX ADMIN — DOXYCYCLINE 100 MG: 100 INJECTION, POWDER, LYOPHILIZED, FOR SOLUTION INTRAVENOUS at 05:50

## 2019-02-08 RX ADMIN — CETIRIZINE HYDROCHLORIDE 10 MG: 10 TABLET, FILM COATED ORAL at 10:56

## 2019-02-08 ASSESSMENT — ENCOUNTER SYMPTOMS
MYALGIAS: 0
SORE THROAT: 0
BACK PAIN: 0
SHORTNESS OF BREATH: 0
FEVER: 0
DIZZINESS: 0
WEAKNESS: 0
CHILLS: 0
ABDOMINAL PAIN: 0
DEPRESSION: 0
HEADACHES: 1
VOMITING: 0
BLURRED VISION: 1
NAUSEA: 0
PHOTOPHOBIA: 1
BRUISES/BLEEDS EASILY: 0
COUGH: 0
EYE PAIN: 1

## 2019-02-08 ASSESSMENT — COGNITIVE AND FUNCTIONAL STATUS - GENERAL
DAILY ACTIVITIY SCORE: 24
SUGGESTED CMS G CODE MODIFIER MOBILITY: CH
SUGGESTED CMS G CODE MODIFIER DAILY ACTIVITY: CH
MOBILITY SCORE: 24

## 2019-02-08 ASSESSMENT — LIFESTYLE VARIABLES: ALCOHOL_USE: NO

## 2019-02-08 NOTE — PROGRESS NOTES
Pharmacy Kinetics 19 y.o. female on vancomycin day # 1 2019    Currently on Vancomycin 1300 mg iv x1 loading dose  (1555)    Indication for Treatment: facial cellulitis     Pertinent history per medical record: Admitted on 2019 for right sided facial swelling onset 1 day ago. She has an abscess to the right side of her face that she has had for the past 5 days that has only progressively gotten worse now causing the left side of her face start to swell.  She took Doxycycline outpt for one day prior to admit.     Other antibiotics: none    Allergies: Iodine; Sulfa drugs; Fentanyl; Cefuroxime; Clindamycin; Gluten meal; Morphine; Other misc; Pcn [penicillins]; and Prednisone     List concerns for renal function: none    Pertinent cultures to date:   19: Blood x2 - in process    MRSA nares swab if pneumonia is a concern (ordered/positive/negative/n-a): n/a    Recent Labs      19   1520   WBC  9.0   NEUTSPOLYS  67.50     Recent Labs      19   1520   BUN  13   CREATININE  0.81   ALBUMIN  4.5     No results for input(s): VANCOTROUGH, VANCOPEAK, VANCORANDOM in the last 72 hours.No intake or output data in the 24 hours ending 19 1618   Blood pressure 110/68, pulse 80, temperature 35.9 °C (96.6 °F), temperature source Temporal, resp. rate 16, height 1.524 m (5'), weight 52.2 kg (115 lb 1.3 oz), SpO2 95 %. Temp (24hrs), Av.9 °C (96.6 °F), Min:35.9 °C (96.6 °F), Max:35.9 °C (96.6 °F)      A/P   1. Vancomycin dose change: 800mg IV q8h   (0000, 0800, 1600)  2. Next vancomycin level: ~1-2 days  (not ordered)  3. Goal trough: 12-16 mcg/mL  4. Comments: Empiric antibiotics started for facial abscess. No leukocytosis and afebrile. Cultures are in process. No concerns for renal function. Start 15mg/kg per protocol. Recommend a trough level prior to 4th or 5th dose to assess accumulation and dose appropriateness. Pharmacy to monitor cultures for possible de-escalation.      Alycia Church, PharmD.,  BCPS

## 2019-02-08 NOTE — PROGRESS NOTES
Pt was administered Vancomycin 250 mg in 250 ml IVPB rate was 50 ml/hr at 2203, slower rate was required due to pt's previous allergic reaction to medication. Pt did not display any signs or symptoms during the first 3 hours of vancomycin infusion. At 0142 pt reports new symptoms of allergic reaction including generalized itching all over. IVPB infusion was stopped and disconnected. Pt does not have any signs or symptoms of respiratory distress, there is no hoarsness in voice at this time. Pt does not have hives or redness in facial area at this time.  Pt's face does appear slightly puffy and swollen. Hives are starting to appear on right lower arm.  Dr. Mcdaniel was paged regarding updates and orders for pt's allergic reaction. Dr. Mcdaniel ordered 25 mg Benadryl IV one time does for itchiness. Dr. Mcdaniel also suggested to address the problem regarding IVBP Vancomycin administration with an allergist. Orders were read back verbally via telephone to verify correctness. Will message pharmacy regarding allergic reaction to discontinue Vancomycin IVPB.

## 2019-02-08 NOTE — PROGRESS NOTES
Dr. Mcdaniel was paged regarding order for IV Benadryl. Pt only has PO form available However, pt is allergic to Vancomycin IVPB and would like to take the Benadryl IV form, per pt report IV benadryl worked well in the emergency department earlier today. Dr. Mcdaniel ordered 25 mg Benadryl IV one time dose. Order was read back verbally via telephone to verify correctness.

## 2019-02-08 NOTE — DIETARY
Nutrition services: Day 1 of admit.  Joan Jesus is a 19 y.o. female with admitting DX of facial cellulitis.  Consult received for poor PO intake pta per nutrition admit screen.     Assessment:  Height: 152.4 cm (5')  Weight: 52.2 kg (115 lb 1.3 oz)  Body mass index is 22.48 kg/m².  Diet/Intake: Regular; 50-75% consumed of breakfast this morning    Evaluation:   1. Pt admitted for facial cellulitis  2. >50% consumed of most recent meal  3. Pt with history of alpha-1-antitrypsin deficiency    Malnutrition Risk: No significant indicators of malnutrition identified at this time.     Recommendations/Plan:  1. RD will monitor PO intake and assess for nutrition intervention as indicated   2. Encourage intake of meals  3. Document intake of all meals as % taken in ADL's to provide interdisciplinary communication across all shifts.   4. Monitor weight.  5. Nutrition rep will continue to see patient for ongoing meal and snack preferences.   6. RD to monitor PO intake, wt trends, and nutrition labs/meds    RD following

## 2019-02-08 NOTE — ASSESSMENT & PLAN NOTE
Significantly improved   Patient presented with complaint of facial cellulitis.  Patient was noticed to have 7 mm infected sebaceous cyst.  Plastic surgery was consulted.  Patient transitioned to Zyvox after noted allergic reaction to Vancomycin .  I&D was completed in ER with packing

## 2019-02-08 NOTE — PROGRESS NOTES
Pt is tolerating Vancomycin IVPB medication administration well. There are no signs or symptoms of an allergic reaction. Pt was administered 25 mg benadryl before vancomycin administration. Pt tolerated intervention well.

## 2019-02-08 NOTE — ASSESSMENT & PLAN NOTE
Patient does have a history of significant asthma.  Currently patient has no respiratory issues   Continue with  breathing treatments and bronchodilator as needed.   RT and O2 for patient as needed

## 2019-02-08 NOTE — PROGRESS NOTES
Pt arrived to the unit. Dressing intact to head, complaints of 5/10 pain. Refused pain medication. VSS. No additional needs at this time.

## 2019-02-08 NOTE — ED NOTES
Pt called staff into room reports hives and itching after abx started. Pt has redness and swelling to bilateral eyes per mother this is new. Redness to right arm and neck from scratching. Vanco stopped  Primary RN notified, Dr Nelson at bedside and notified

## 2019-02-08 NOTE — H&P
Hospital Medicine History and Physical    Date of Service  2/7/2019    Chief Complaint  Chief Complaint   Patient presents with   • Wound Infection   • Sent by MD   • Blurred Vision       History of Presenting Illness  19 y.o. female with a past medical history of asthma, alpha-1 antitrypsin deficiency, multiple allergic reaction to medication, presented 2/7/2019 with complaint of facial swelling and pain.  Patient was noticed to have infected sebaceous cyst on her forehead and sent him by her primary care physician.  Patient failed outpatient antibiotics doxycycline.  Patient also complains of dizziness and vision changes however patient said currently she feels a little better after treated with IV antibiotics and IV fluid.  Patient complains of pain, Patient's pain is local, 5-6/10, intermittent and does not radiate to other location, sharp and with some tingling. Can be controlled by pain meds. Patient otherwise denies fever, chills, nausea, vomiting, adb pain, SOB, CP, headache, constipation, diarrhea, cough, or sputum.    Primary Care Physician  Marcos Dunlap M.D.    Consultants  Plastic surgery    Code Status  Code: Full code    Review of Systems  Review of Systems   Constitutional: Negative for chills, fever and weight loss.   HENT: Negative for congestion, ear discharge, ear pain, hearing loss and nosebleeds.         Facial swelling and pain   Eyes: Negative for blurred vision and pain.   Respiratory: Negative for cough, sputum production, shortness of breath and wheezing.    Cardiovascular: Negative for chest pain, palpitations, leg swelling and PND.   Gastrointestinal: Negative for abdominal pain, constipation, diarrhea, heartburn, nausea and vomiting.   Genitourinary: Negative for dysuria, frequency and hematuria.   Musculoskeletal: Negative for back pain, falls, joint pain and neck pain.   Skin: Negative for rash.   Neurological: Negative for dizziness, tremors, speech change, seizures, weakness  "and headaches.   Psychiatric/Behavioral: Negative for depression, substance abuse and suicidal ideas.      Past Medical History  Past Medical History:   Diagnosis Date   • Alpha-1-antitrypsin deficiency (HCC)    • Asthma    • Dental disorder     permanent retainer lower    • Food allergy     \"jalepenos make my lips and face swell\"   • Gynecological disorder     ovarian cysts   • Heart burn     resolved   • Hypoglycemia    • Seasonal allergies        Surgical History  Past Surgical History:   Procedure Laterality Date   • TONSILLECTOMY AND ADENOIDECTOMY N/A 5/25/2018    Procedure: TONSILLECTOMY AND ADENOIDECTOMY;  Surgeon: Luiza Callahan M.D.;  Location: [facility];  Service: Ent   • ANTROSTOMY Bilateral 3/25/2016    Procedure: ANTROSTOMY - ENDOSCOPIC MAXILLARY W/FRONTAL SINUS EXPLORATION;  Surgeon: Luiza Callahan M.D.;  Location: [facility];  Service:    • ETHMOIDECTOMY Bilateral 3/25/2016    Procedure: ETHMOIDECTOMY - ENDOSCOPIC ;  Surgeon: Luiza Callahan M.D.;  Location: [facility];  Service:    • SPHENOIDECTOMY Bilateral 3/25/2016    Procedure: SPHENOIDOTOMY;  Surgeon: Luiza Callahan M.D.;  Location: [facility];  Service:    • TURBINOPLASTY Bilateral 3/25/2016    Procedure: TURBINOPLASTY;  Surgeon: Luiza Callahan M.D.;  Location: [facility];  Service:    • APPENDECTOMY CHILD  2013   • INGUINAL HERNIA REPAIR  2004       Medications  No current facility-administered medications on file prior to encounter.      Current Outpatient Prescriptions on File Prior to Encounter   Medication Sig Dispense Refill   • alpha-1-proteinase inhibitor (PROLASTIN) 500 MG Recon Soln 4,880 mg by Intravenous route every 7 days. Every Wednesday or Friday, depending on preference of Pt     • etonogestrel (NEXPLANON) 68 MG Implant implant Inject 1 Each as instructed Once.     • budesonide-formoterol (SYMBICORT) 160-4.5 MCG/ACT Aerosol Inhale 2 Puffs " by mouth 2 Times a Day.     • montelukast (SINGULAIR) 10 MG Tab Take 10 mg by mouth every evening.     • cetirizine (ZYRTEC) 10 MG Tab Take 10 mg by mouth 2 Times a Day.     • fluticasone (FLONASE) 50 MCG/ACT nasal spray Spray 1 Spray in nose as needed.     • levalbuterol (XOPENEX) 1.25 MG/3ML Nebu Soln 1.25 mg by Nebulization route every four hours as needed for Shortness of Breath.         Family History  Family History   Problem Relation Age of Onset   • Hypertension Mother    • Other Mother    • Asthma Father      reviewed and felt non pertinent to this encounter     Social History  Social History   Substance Use Topics   • Smoking status: Never Smoker   • Smokeless tobacco: Never Used   • Alcohol use No       Allergies  Allergies   Allergen Reactions   • Iodine Anaphylaxis   • Sulfa Drugs Anaphylaxis   • Fentanyl Vomiting   • Cefuroxime Hives   • Clindamycin Hives   • Gluten Meal Diarrhea     Blisters     • Morphine Nausea     Severe nausea   • Other Misc Rash     Electrode pads   • Pcn [Penicillins] Hives   • Prednisone Hives        Physical Exam  Laboratory   Vitals/ General Appearance:   Weight/BMI: Body mass index is 22.48 kg/m².  Blood pressure 110/68, pulse 80, temperature 35.9 °C (96.6 °F), temperature source Temporal, resp. rate 16, height 1.524 m (5'), weight 52.2 kg (115 lb 1.3 oz), SpO2 95 %.   Vitals:    02/07/19 1136 02/07/19 1139   BP: 110/68    Pulse: 80    Resp: 16    Temp: 35.9 °C (96.6 °F)    TempSrc: Temporal    SpO2: 95%    Weight:  52.2 kg (115 lb 1.3 oz)   Height: 1.524 m (5')     Oxygen Therapy:  Pulse Oximetry: 95 %    Constitutional:  well developed, well nourished, non-toxic, no acute distress  HENMT: Normocephalic, atraumatic, b/l ears normal, nose normal  Eyes:  EOMI, conjunctiva normal, no discharge  Neck: no tracheal deviation, supple  Cardiovascular: normal heart rate, normal rhythm, no murmurs, no rubs or gallops; no cyanosis, clubbing or edema  Lungs: Respiratory effort is  normal, normal breath sounds, breath sounds clear to auscultation b/l, no rales, rhonchi or wheezing  Abdomen: soft, non-tender, no guarding or rebound, active BS, no mass  Skin: warm, dry, no erythema, no rash  Neurologic: Alert and oriented, strength 5/5, no focal deficits, CN II-XII normal  Psychiatric: No anxiety or depression  Lymph node: No lymphadenopathy appreciated in the neck groin and axillary area.   Extremities: Bilateral lower extremities no pitting edema, bilateral pulses symmetric          Assessment/Plan  * Facial cellulitis- (present on admission)   Assessment & Plan    Patient presented with complaint of facial cellulitis.  Patient was noticed to have 7 mm infected sebaceous cyst.  Plastic surgery was consulted.  I start patient on IV vancomycin given patient's history of multiple allergic reaction with multiple medication.  Patient will be admitted to the hospital for close monitor and pending possible I&D by plastic surgeon.     Chronic pansinusitis- (present on admission)   Assessment & Plan    Currently has no symptoms.  Continue monitor patient is not on suppressive antibiotics.  Patient currently on IV vancomycin.     Asthma, moderate persistent- (present on admission)   Assessment & Plan    Patient does have a history of significant asthma.  Currently patient has no respiratory condition problem.  I restart patient breathing treatment and bronchodilator.  I ordered patient Singulair.  I ordered RT and O2 for patient.         I anticipate this patient is appropriate for observation status at this time.    Prophylaxis: SCDs    Recent Labs      02/07/19   1520   WBC  9.0   RBC  4.88   HEMOGLOBIN  14.9   HEMATOCRIT  44.3   MCV  90.8   MCH  30.5   MCHC  33.6   RDW  43.4   PLATELETCT  233   MPV  11.1     Recent Labs      02/07/19   1520   SODIUM  137   POTASSIUM  3.8   CHLORIDE  108   CO2  22   GLUCOSE  88   BUN  13   CREATININE  0.81   CALCIUM  9.7     Recent Labs      02/07/19   1520   ALTSGPT   11   ASTSGOT  14   ALKPHOSPHAT  46   TBILIRUBIN  0.4   GLUCOSE  88                 No results found for: TROPONINI    Imaging  US-EXTREMITY NON VASCULAR UNILATERAL RIGHT   Final Result      7 mm sebaceous cyst.             I have discussed patient admission status with  in the ER.    I evaluated the patient, reviewing the chart, vitals, labs and imaging, discussing the case with ED physician, medication reconciliation, placing orders and enacting the plan above.

## 2019-02-08 NOTE — PROGRESS NOTES
PLASTICS    Patient states her forehead is feeling much better since the I&D, still has some periorbital swelling.  Forehead erythema much improved; packing removed, no further purulent drainage.  Did not tolerate last dose of vanco, now on zyvox.    Will continue to monitor response to antibiotics.  Will let wound heal secondarily and can discuss scar revision at a later time.

## 2019-02-08 NOTE — PROGRESS NOTES
2 RN skin check is complete this time with CIARA Nunez. Areas of concern include redness and swelling under right and left eyes, also a wound on the top right of pt's forehead from I&D procedure today. All other skin is intact pink and blanching.

## 2019-02-08 NOTE — CARE PLAN
Problem: Communication  Goal: The ability to communicate needs accurately and effectively will improve  Outcome: PROGRESSING AS EXPECTED  Pt is encouraged to voice feelings, pt verbalizes needs to staff members effectively.    Problem: Knowledge Deficit  Goal: Knowledge of disease process/condition, treatment plan, diagnostic tests, and medications will improve  Outcome: PROGRESSING AS EXPECTED  Pt educated regarding plan of care and medications. All questions answered.

## 2019-02-08 NOTE — PROGRESS NOTES
"Pt was administered IV benadryl. Pt reports feeling \"less itchy\" pt is tolerating intervention well. Pt does still have hives located on upper posterior right thigh, hives and redess are also located on pt's left upper arm near armpit area. VS are stable at this time.  "

## 2019-02-08 NOTE — PROGRESS NOTES
Pt resting in bed. Mother at bedside. BP 89/50 this AM. MD aware. Recheck was 104/65.    Pt given new antibiotic this AM; plan to monitor for reactions.       Safety: Call light in reach and fall precautions in place.       1840 edit: Pt tolerating Zyvox well x2 doses; no evidence of reactions at this time. Pt napping intermittently throughout afternoon. Denies allergy symptoms.

## 2019-02-08 NOTE — ASSESSMENT & PLAN NOTE
Currently has no symptoms.  Continue monitor patient is not on suppressive antibiotics.  Patient currently on IV Zyvox

## 2019-02-09 ENCOUNTER — HOSPITAL ENCOUNTER (EMERGENCY)
Facility: MEDICAL CENTER | Age: 19
End: 2019-02-09
Attending: EMERGENCY MEDICINE
Payer: COMMERCIAL

## 2019-02-09 VITALS
SYSTOLIC BLOOD PRESSURE: 94 MMHG | WEIGHT: 115.08 LBS | HEART RATE: 75 BPM | BODY MASS INDEX: 22.59 KG/M2 | OXYGEN SATURATION: 95 % | HEIGHT: 60 IN | RESPIRATION RATE: 17 BRPM | DIASTOLIC BLOOD PRESSURE: 54 MMHG | TEMPERATURE: 98.5 F

## 2019-02-09 VITALS
WEIGHT: 115.52 LBS | DIASTOLIC BLOOD PRESSURE: 68 MMHG | RESPIRATION RATE: 18 BRPM | SYSTOLIC BLOOD PRESSURE: 111 MMHG | TEMPERATURE: 97.9 F | HEIGHT: 60 IN | HEART RATE: 86 BPM | OXYGEN SATURATION: 99 % | BODY MASS INDEX: 22.68 KG/M2

## 2019-02-09 DIAGNOSIS — T78.40XA ALLERGIC REACTION, INITIAL ENCOUNTER: ICD-10-CM

## 2019-02-09 LAB
ANION GAP SERPL CALC-SCNC: 6 MMOL/L (ref 0–11.9)
BASOPHILS # BLD AUTO: 0.5 % (ref 0–1.8)
BASOPHILS # BLD: 0.04 K/UL (ref 0–0.12)
BUN SERPL-MCNC: 21 MG/DL (ref 8–22)
CALCIUM SERPL-MCNC: 9.6 MG/DL (ref 8.5–10.5)
CHLORIDE SERPL-SCNC: 106 MMOL/L (ref 96–112)
CO2 SERPL-SCNC: 26 MMOL/L (ref 20–33)
CREAT SERPL-MCNC: 1.07 MG/DL (ref 0.5–1.4)
EOSINOPHIL # BLD AUTO: 0.24 K/UL (ref 0–0.51)
EOSINOPHIL NFR BLD: 3 % (ref 0–6.9)
ERYTHROCYTE [DISTWIDTH] IN BLOOD BY AUTOMATED COUNT: 43.5 FL (ref 35.9–50)
GLUCOSE SERPL-MCNC: 101 MG/DL (ref 65–99)
HCT VFR BLD AUTO: 43.9 % (ref 37–47)
HGB BLD-MCNC: 14.3 G/DL (ref 12–16)
IMM GRANULOCYTES # BLD AUTO: 0.03 K/UL (ref 0–0.11)
IMM GRANULOCYTES NFR BLD AUTO: 0.4 % (ref 0–0.9)
LYMPHOCYTES # BLD AUTO: 2.35 K/UL (ref 1–4.8)
LYMPHOCYTES NFR BLD: 29.4 % (ref 22–41)
MCH RBC QN AUTO: 30 PG (ref 27–33)
MCHC RBC AUTO-ENTMCNC: 32.6 G/DL (ref 33.6–35)
MCV RBC AUTO: 92 FL (ref 81.4–97.8)
MONOCYTES # BLD AUTO: 0.66 K/UL (ref 0–0.85)
MONOCYTES NFR BLD AUTO: 8.3 % (ref 0–13.4)
NEUTROPHILS # BLD AUTO: 4.66 K/UL (ref 2–7.15)
NEUTROPHILS NFR BLD: 58.4 % (ref 44–72)
NRBC # BLD AUTO: 0 K/UL
NRBC BLD-RTO: 0 /100 WBC
PLATELET # BLD AUTO: 223 K/UL (ref 164–446)
PMV BLD AUTO: 11.3 FL (ref 9–12.9)
POTASSIUM SERPL-SCNC: 4.2 MMOL/L (ref 3.6–5.5)
RBC # BLD AUTO: 4.77 M/UL (ref 4.2–5.4)
SODIUM SERPL-SCNC: 138 MMOL/L (ref 135–145)
WBC # BLD AUTO: 8 K/UL (ref 4.8–10.8)

## 2019-02-09 PROCEDURE — A9270 NON-COVERED ITEM OR SERVICE: HCPCS | Performed by: FAMILY MEDICINE

## 2019-02-09 PROCEDURE — 80048 BASIC METABOLIC PNL TOTAL CA: CPT

## 2019-02-09 PROCEDURE — 36415 COLL VENOUS BLD VENIPUNCTURE: CPT

## 2019-02-09 PROCEDURE — 99239 HOSP IP/OBS DSCHRG MGMT >30: CPT | Performed by: FAMILY MEDICINE

## 2019-02-09 PROCEDURE — 700102 HCHG RX REV CODE 250 W/ 637 OVERRIDE(OP): Performed by: INTERNAL MEDICINE

## 2019-02-09 PROCEDURE — A9270 NON-COVERED ITEM OR SERVICE: HCPCS | Performed by: INTERNAL MEDICINE

## 2019-02-09 PROCEDURE — 99284 EMERGENCY DEPT VISIT MOD MDM: CPT

## 2019-02-09 PROCEDURE — 85025 COMPLETE CBC W/AUTO DIFF WBC: CPT

## 2019-02-09 PROCEDURE — 700102 HCHG RX REV CODE 250 W/ 637 OVERRIDE(OP): Performed by: FAMILY MEDICINE

## 2019-02-09 RX ORDER — EPINEPHRINE 0.3 MG/.3ML
0.3 INJECTION SUBCUTANEOUS
Qty: 1 EACH | Refills: 3 | Status: SHIPPED | OUTPATIENT
Start: 2019-02-09

## 2019-02-09 RX ORDER — IBUPROFEN 400 MG/1
400 TABLET ORAL EVERY 6 HOURS PRN
Qty: 30 TAB | COMMUNITY
Start: 2019-02-09 | End: 2019-10-17

## 2019-02-09 RX ORDER — EPINEPHRINE 0.3 MG/.3ML
0.3 INJECTION SUBCUTANEOUS
Qty: 1 EACH | Refills: 3 | Status: SHIPPED | OUTPATIENT
Start: 2019-02-09 | End: 2019-02-09

## 2019-02-09 RX ORDER — LINEZOLID 600 MG/1
600 TABLET, FILM COATED ORAL EVERY 12 HOURS
Qty: 10 TAB | Refills: 0 | Status: SHIPPED | OUTPATIENT
Start: 2019-02-09 | End: 2019-02-14

## 2019-02-09 RX ORDER — DOXYCYCLINE 100 MG/1
100 CAPSULE ORAL 2 TIMES DAILY
Qty: 14 CAP | Refills: 0 | Status: SHIPPED | OUTPATIENT
Start: 2019-02-09 | End: 2019-02-16

## 2019-02-09 RX ORDER — LINEZOLID 600 MG/1
600 TABLET, FILM COATED ORAL EVERY 12 HOURS
Qty: 10 TAB | Refills: 0 | Status: SHIPPED | OUTPATIENT
Start: 2019-02-09 | End: 2019-02-09

## 2019-02-09 RX ADMIN — LINEZOLID 600 MG: 600 TABLET, FILM COATED ORAL at 05:33

## 2019-02-09 RX ADMIN — BUDESONIDE AND FORMOTEROL FUMARATE DIHYDRATE 2 PUFF: 160; 4.5 AEROSOL RESPIRATORY (INHALATION) at 08:18

## 2019-02-09 RX ADMIN — CETIRIZINE HYDROCHLORIDE 10 MG: 10 TABLET, FILM COATED ORAL at 08:19

## 2019-02-09 ASSESSMENT — LIFESTYLE VARIABLES: DO YOU DRINK ALCOHOL: NO

## 2019-02-09 NOTE — PROGRESS NOTES
Hospital Medicine Daily Progress Note    Date of Service  2/8/2019    Chief Complaint  19 y.o. female admitted 2/7/2019 with wound infection sent by MD, blurred vision     Hospital Course    Patient has known history of asthma, alpha-1 antitrypsin deficiency, and multiple allergies with reactions to medications.  Patient presented to the emergency room with complaints of facial swelling and pain after having been evaluated by urgent care on 2 separate occasions.  Patient then went to her dermatologist office who felt this was likely an infection and referred her to emergency room for further evaluation.  Patient was noted to have infected sebaceous cyst to right forehead that was I&D'd in the emergency room with packing placed.  Plastics was consulted to evaluate patient.  Patient was placed on IV antibiotics of vancomycin and admitted for further workup.      Interval Problem Update  2/8-patient was noted to have allergic reaction to vancomycin, causing hives to her arms.  Patient was transitioned to Zyvox.  Patient states her vision has improved however still slightly blurry and that the swelling to her forehead and eyes has decreased significantly.  Patient continues to have packing to I&D site above right eyebrow.  Plastics to see patient later today.  We will continue to monitor patient on Zyvox.  Blood cultures remain negative to date.     Consultants/Specialty  Plastics- Dr. Nelson     Code Status  Full     Disposition  Likely home with PO antibiotics and close outpatient follow up     Review of Systems  Review of Systems   Constitutional: Negative for chills and fever.   HENT: Negative for congestion and sore throat.         Erythema and edema to forehead and face   Eyes: Positive for blurred vision, photophobia and pain.        Orbital edema    Respiratory: Negative for cough and shortness of breath.    Cardiovascular: Negative for chest pain and leg swelling.   Gastrointestinal: Negative for abdominal  pain, nausea and vomiting.   Genitourinary: Negative for dysuria, frequency and urgency.   Musculoskeletal: Negative for back pain and myalgias.   Skin: Positive for rash.        Packing to forehead   Neurological: Positive for headaches. Negative for dizziness and weakness.   Endo/Heme/Allergies: Does not bruise/bleed easily.   Psychiatric/Behavioral: Negative for depression.        Physical Exam  Temp:  [36.4 °C (97.5 °F)-37.4 °C (99.3 °F)] 37.4 °C (99.3 °F)  Pulse:  [69-91] 84  Resp:  [16-18] 16  BP: ()/(50-66) 100/54  SpO2:  [94 %-100 %] 95 %    Physical Exam   Constitutional: She is oriented to person, place, and time. She appears well-developed. She is active and cooperative. No distress.   Pleasant young female resting in bed in no acute distress.    HENT:   Head: Normocephalic.   Mouth/Throat: Oropharynx is clear and moist.   Edema to forehead and right side of face, packing to I&D site above right eyebrow   Eyes: Conjunctivae are normal.   Periorbital edema bilaterally    Neck: Neck supple. No tracheal tenderness present.   Cardiovascular: Normal rate, regular rhythm and normal heart sounds.  Exam reveals no gallop.    Pulmonary/Chest: Effort normal and breath sounds normal. No respiratory distress. She has no decreased breath sounds. She has no wheezes.   Abdominal: Soft. Normal appearance and bowel sounds are normal. She exhibits no distension. There is no tenderness.   Musculoskeletal:   HERNDON    Neurological: She is alert and oriented to person, place, and time. She has normal strength. No sensory deficit.   Skin: Skin is warm and dry. She is not diaphoretic.   Packing to I&D site on right forehead, erythema and edema    Psychiatric: She has a normal mood and affect. Her speech is normal and behavior is normal.   Nursing note and vitals reviewed.      Fluids    Intake/Output Summary (Last 24 hours) at 02/08/19 1927  Last data filed at 02/08/19 1330   Gross per 24 hour   Intake             1080 ml    Output              200 ml   Net              880 ml       Laboratory  Recent Labs      02/07/19   1520  02/08/19   0244   WBC  9.0  7.4   RBC  4.88  4.57   HEMOGLOBIN  14.9  13.9   HEMATOCRIT  44.3  42.2   MCV  90.8  92.3   MCH  30.5  30.4   MCHC  33.6  32.9*   RDW  43.4  44.0   PLATELETCT  233  209   MPV  11.1  11.4     Recent Labs      02/07/19   1520  02/08/19   0244   SODIUM  137  141   POTASSIUM  3.8  4.0   CHLORIDE  108  109   CO2  22  22   GLUCOSE  88  78   BUN  13  13   CREATININE  0.81  0.86   CALCIUM  9.7  8.4*                   Imaging  US-EXTREMITY NON VASCULAR UNILATERAL RIGHT   Final Result      7 mm sebaceous cyst.           Assessment/Plan  * Facial cellulitis- (present on admission)   Assessment & Plan    Significantly improved   Patient presented with complaint of facial cellulitis.  Patient was noticed to have 7 mm infected sebaceous cyst.  Plastic surgery was consulted.  Patient transitioned to Zyvox after noted allergic reaction to Vancomycin .  I&D was completed in ER with packing      Chronic pansinusitis- (present on admission)   Assessment & Plan    Currently has no symptoms.  Continue monitor patient is not on suppressive antibiotics.  Patient currently on IV Zyvox      Asthma, moderate persistent- (present on admission)   Assessment & Plan    Patient does have a history of significant asthma.  Currently patient has no respiratory issues   Continue with  breathing treatments and bronchodilator as needed.   RT and O2 for patient as needed           VTE prophylaxis: Ambulatory, SCD

## 2019-02-09 NOTE — PROGRESS NOTES
PLASTICS    Patient continuing to improve, swelling and redness around I&D site nearly completely resolved, no further drainage when I push on it.  Periorbital swelling improving.  Okay for discharge from my standpoint.    I discussed wound care with her - daily washing, antibiotic ointment, bandaid until closed.  She can follow up in the office next week, call Monday for appointment.

## 2019-02-09 NOTE — PROGRESS NOTES
"Pt and mother concerned about being several days past Prolastin infusion due date, stating \"my lungs hurt.\" Call placed this afternoon by this RN to [facility] for information about possibly receiving infusion as inpatient. Spoke with [employer] [place] and Delmer (owner/PharmD). Unable to complete infusion as inpatient d/t policy and insurance barriers. Delmer assured this RN of pt ability to get infusion as soon as discharged.     Endorsed to pt and mother, who has also spoken with infusion center.   "

## 2019-02-09 NOTE — CARE PLAN
Problem: Communication  Goal: The ability to communicate needs accurately and effectively will improve  Outcome: PROGRESSING AS EXPECTED  Pt is encouraged to voice feelings, pt is able to verbalize needs to staff appropriately.    Problem: Knowledge Deficit  Goal: Knowledge of disease process/condition, treatment plan, diagnostic tests, and medications will improve  Outcome: PROGRESSING AS EXPECTED  Pt educated regarding plan of care and medications including Zyvox Linezolid medication. All questions answered.

## 2019-02-09 NOTE — PROGRESS NOTES
Discharging pt home per physician order.  Discharged with mother.  Demonstrated understanding of discharge instructions, need for prn follow up appointments, home medications, home care for forehead wound.  Ambulating without assistance, voiding without difficulty, pain well controlled, tolerating oral medications, oxygen saturation >90%, and tolerating diet.  Educational handouts on cellulitis given and discussed.  Verbalized understanding of discharge instructions and educational handouts.  All questions answered, belongings with patient at the time of discharge.

## 2019-02-09 NOTE — ED TRIAGE NOTES
".  Chief Complaint   Patient presents with   • Allergic Reaction     pt started taking zyvox a few days ago, now is having throat swelling/itching/tingling lips, pt regularly has delayed reactions per mother     Patient ambulatory to triage for above complaints; A&O X4; no swelling observed to tongue, pt is able to swallow secretions at this time but states \"with difficulty\"; pt took 25 mg benadryl PTA; NAD observed but Charge RN notified of pt.   Patient placed in lobby and educated to notify staff of new or worsening symptoms.     "

## 2019-02-09 NOTE — PROGRESS NOTES
Bedside report received. Pt is resting in bed with no signs or symptoms of allergic reaction to new medication linezdid Zyvox. Pt does not have facial swelling, redness or hives at this time. POC discussed with pt; all questions answered at this time.

## 2019-02-09 NOTE — DISCHARGE INSTRUCTIONS
Discharge Instructions    Discharged to home by car with relative. Discharged via wheelchair, hospital escort: Yes.  Special equipment needed: Not Applicable    Be sure to schedule a follow-up appointment with your primary care doctor or any specialists as instructed.     Discharge Plan:   Diet Plan: Discussed (heart healthy )  Activity Level: Discussed (as tolerated)  Confirmed Follow up Appointment: Patient to Call and Schedule Appointment  Confirmed Symptoms Management: Discussed  Medication Reconciliation Updated: Yes  Pneumococcal Vaccine Administered/Refused: Not given - Patient refused pneumococcal vaccine  Influenza Vaccine Indication: Patient Refuses    I understand that a diet low in cholesterol, fat, and sodium is recommended for good health. Unless I have been given specific instructions below for another diet, I accept this instruction as my diet prescription.   Other diet: heart healthy    Special Instructions: None    · Is patient discharged on Warfarin / Coumadin?   No     Depression / Suicide Risk    As you are discharged from this [facility] facility, it is important to learn how to keep safe from harming yourself.    Recognize the warning signs:  · Abrupt changes in personality, positive or negative- including increase in energy   · Giving away possessions  · Change in eating patterns- significant weight changes-  positive or negative  · Change in sleeping patterns- unable to sleep or sleeping all the time   · Unwillingness or inability to communicate  · Depression  · Unusual sadness, discouragement and loneliness  · Talk of wanting to die  · Neglect of personal appearance   · Rebelliousness- reckless behavior  · Withdrawal from people/activities they love  · Confusion- inability to concentrate     If you or a loved one observes any of these behaviors or has concerns about self-harm, here's what you can do:  · Talk about it- your feelings and reasons for harming yourself  · Remove any means  that you might use to hurt yourself (examples: pills, rope, extension cords, firearm)  · Get professional help from the community (Mental Health, Substance Abuse, psychological counseling)  · Do not be alone:Call your Safe Contact- someone whom you trust who will be there for you.  · Call your local CRISIS HOTLINE 658-8305 or 560-462-0684  · Call your local Children's Mobile Crisis Response Team [place] (147) 506-2128 or wwwBar [employer] Stats  · Call the toll free National Suicide Prevention Hotlines   · National Suicide Prevention Lifeline 210-170-AYBB (8317)  · National Hope Line Network 800-SUICIDE (983-5568)        Cellulitis, Adult  Cellulitis is a skin infection. The infected area is usually red and tender. This condition occurs most often in the arms and lower legs. The infection can travel to the muscles, blood, and underlying tissue and become serious. It is very important to get treated for this condition.  What are the causes?  Cellulitis is caused by bacteria. The bacteria enter through a break in the skin, such as a cut, burn, insect bite, open sore, or crack.  What increases the risk?  This condition is more likely to occur in people who:  · Have a weak defense system (immune system).  · Have open wounds on the skin such as cuts, burns, bites, and scrapes. Bacteria can enter the body through these open wounds.  · Are older.  · Have diabetes.  · Have a type of long-lasting (chronic) liver disease (cirrhosis) or kidney disease.  · Use IV drugs.  What are the signs or symptoms?  Symptoms of this condition include:  · Redness, streaking, or spotting on the skin.  · Swollen area of the skin.  · Tenderness or pain when an area of the skin is touched.  · Warm skin.  · Fever.  · Chills.  · Blisters.  How is this diagnosed?  This condition is diagnosed based on a medical history and physical exam. You may also have tests, including:  · Blood tests.  · Lab tests.  · Imaging tests.  How is this  treated?  Treatment for this condition may include:  · Medicines, such as antibiotic medicines or antihistamines.  · Supportive care, such as rest and application of cold or warm cloths (cold or warm compresses) to the skin.  · Hospital care, if the condition is severe.  The infection usually gets better within 1-2 days of treatment.  Follow these instructions at home:  · Take over-the-counter and prescription medicines only as told by your health care provider.  · If you were prescribed an antibiotic medicine, take it as told by your health care provider. Do not stop taking the antibiotic even if you start to feel better.  · Drink enough fluid to keep your urine clear or pale yellow.  · Do not touch or rub the infected area.  · Raise (elevate) the infected area above the level of your heart while you are sitting or lying down.  · Apply warm or cold compresses to the affected area as told by your health care provider.  · Keep all follow-up visits as told by your health care provider. This is important. These visits let your health care provider make sure a more serious infection is not developing.  Contact a health care provider if:  · You have a fever.  · Your symptoms do not improve within 1-2 days of starting treatment.  · Your bone or joint underneath the infected area becomes painful after the skin has healed.  · Your infection returns in the same area or another area.  · You notice a swollen bump in the infected area.  · You develop new symptoms.  · You have a general ill feeling (malaise) with muscle aches and pains.  Get help right away if:  · Your symptoms get worse.  · You feel very sleepy.  · You develop vomiting or diarrhea that persists.  · You notice red streaks coming from the infected area.  · Your red area gets larger or turns dark in color.  This information is not intended to replace advice given to you by your health care provider. Make sure you discuss any questions you have with your health  care provider.  Document Released: 09/27/2006 Document Revised: 04/27/2017 Document Reviewed: 10/26/2016  Elsevier Interactive Patient Education © 2017 [employer]

## 2019-02-09 NOTE — CARE PLAN
Problem: Infection  Goal: Will remain free from infection  Outcome: PROGRESSING AS EXPECTED  Pt wound clean, non purulent; pt receiving antibiotics per MAR.     Problem: Pain Management  Goal: Pain level will decrease to patient's comfort goal  Outcome: PROGRESSING AS EXPECTED  Minimal pain verbalized and managed with non narcotic medication.

## 2019-02-09 NOTE — ED PROVIDER NOTES
"ED Provider Note    CHIEF COMPLAINT  Chief Complaint   Patient presents with   • Allergic Reaction     pt started taking zyvox a few days ago, now is having throat swelling/itching/tingling lips, pt regularly has delayed reactions per mother       HPI  Joan Jesus is a 19 y.o. female who presents complaining of throat swelling after taking Zyvox for facial cellulitis.    Patient states she developed an infected sebaceous cyst on her forehead last Wednesday or Thursday.  She was admitted for this and had an incision and drainage by Dr. Nelson from plastics.  Initially, she was started on vancomycin which she reacted to, initially with facial redness/flushing, and then with facial edema.  This was discontinued.  She then received doxycycline which she did not react to but was changed to Zyvox upon discharge today.  Patient developed itchy throat and a swollen sensation approximately 3 hours following administration of Zyvox.  Patient has multiple allergies to various medications.  She does not have a current EpiPen.      ALLERGIES  Allergies   Allergen Reactions   • Iodine Anaphylaxis   • Sulfa Drugs Anaphylaxis   • Fentanyl Vomiting   • Cefuroxime Hives   • Clindamycin Hives   • Gluten Meal Diarrhea     Blisters     • Morphine Nausea     Severe nausea   • Other Misc Rash     Electrode pads   • Pcn [Penicillins] Hives   • Prednisone Hives   • Vancomycin Hives, Rash, Itching and Swelling      Hives were present on Right inner thigh, Left upper arm, back, facial swelling was present and pt reports generalized itching.   • Zyvox [Linezolid]      \"throat swelling, lips tingling/itching, dizzy\"       CURRENT MEDICATIONS  Zyrtec, Flonase, Xopenex, Singulair, Symbicort, Prolastin, Benadryl, Zyvox    PAST MEDICAL HISTORY   has a past medical history of Alpha-1-antitrypsin deficiency (HCC); Asthma; Dental disorder; Food allergy; Gynecological disorder; Heart burn; Hypoglycemia; and Seasonal allergies.    SURGICAL " HISTORY   has a past surgical history that includes inguinal hernia repair (2004); appendectomy child (2013); antrostomy (Bilateral, 3/25/2016); ethmoidectomy (Bilateral, 3/25/2016); sphenoidectomy (Bilateral, 3/25/2016); turbinoplasty (Bilateral, 3/25/2016); tonsillectomy and adenoidectomy (N/A, 5/25/2018); and other.    SOCIAL HISTORY  Social History     Social History Main Topics   • Smoking status: Never Smoker   • Smokeless tobacco: Never Used   • Alcohol use No      Comment: Alpha 1 Anti tripson deficiency   • Drug use: No   • Sexual activity: Not on file       REVIEW OF SYSTEMS  See HPI for further details.  All other systems are negative except as above in HPI.      PHYSICAL EXAM  VITAL SIGNS: /68   Pulse 88   Temp 36.6 °C (97.9 °F) (Temporal)   Resp 16   Ht 1.524 m (5')   Wt 52.4 kg (115 lb 8.3 oz)   SpO2 96%   BMI 22.56 kg/m²     General:  WDWN, nontoxic appearing in NAD; A+Ox3; V/S as above   Skin: warm and dry; good color; no rash  HEENT: NC; scabbed area over the right frontal region; no erythema, fluctuance, drainage, or streaking; EOMs intact; PERRL; no scleral icterus; no obvious oropharyngeal edema  Neck: FROM; no stridor  Cardiovascular: Regular heart rate and rhythm.  No murmurs, rubs, or gallops; pulses 2+ bilaterally radially  Lungs: Clear to auscultation with good air movement bilaterally.  No wheezes, rhonchi, or rales.   Extremities: HERNDON x 4; no e/o trauma; no pedal edema; neg Lars's  Neurologic: CNs III-XII grossly intact; speech clear; distal sensation intact; strength 5/5 UE/LEs  Psychiatric: Appropriate affect, normal mood    MEDICAL RECORD  I have reviewed patient's medical record and pertinent results are listed below.      COURSE & MEDICAL DECISION MAKING  I have reviewed any medical record information, laboratory studies and radiographic results as noted.    Jona Jesus is a 19 y.o. female who presents complaining of allergic reaction.  Patient complains of a  sensation of throat swelling.  Patient has severe allergies to multiple medications.  Patient is hemodynamically stable.  No hypoxia or respiratory distress.     Patient's mother feels she overreacted.  I reassured her that should always come to the ER for any respiratory issues.      I contacted the hospitalist taking care of the patient earlier today and advised him of the reaction she had to Zyvox.  We agreed that doxycycline would be a good choice for her at this time and to discontinue Zyvox.  She is also given a prescription for an EpiPen as her one at home is .  Mom and patient are in agreement with this plan.  I have encouraged her to continue with Benadryl for at least the next 24 hours.      FINAL IMPRESSION  1. Allergic reaction, initial encounter        Electronically signed by: Shannan Mckeon, 2019 3:53 PM

## 2019-02-09 NOTE — DISCHARGE SUMMARY
"Hospital Medicine Discharge Note     Patient ID:  Joan Jesus  [id number]  19 y.o.female  2000    Admit Date:  2/7/2019       Discharge Date:   2/9/2019    Primary Care Provider: Marcos Dunlap M.D.    Admitting Physician: Susan Dang M.D.  Discharging Physician: Bridger Hyatt M.D.      Chief Complaint: Wound infection     Discharge Diagnoses:   Principal Problem:    Facial cellulitis-patient to continue with Zyvox as she has had no allergic reaction on administration in hospital.  Patient will follow up with Dr. Nelson from plastics.   Active Problems:    Asthma, moderate persistent-not in acute exacerbation continue outpatient management    Chronic [id number] with PCP for continued outpatient management      Chronic Medical Problems:  Past Medical History:   Diagnosis Date   • Alpha-1-antitrypsin deficiency (HCC)    • Asthma    • Dental disorder     permanent retainer lower    • Food allergy     \"jalepenos make my lips and face swell\"   • Gynecological disorder     ovarian cysts   • Heart burn     resolved   • Hypoglycemia    • Seasonal allergies        Code Status: Full Code    Hospital Summary:       Please refer to H&P by  Susan Dang M.D. on 2/7/2019 for full details.      In brief, Joan Jesus is a 19 y.o. female who was admitted 2/7/2019 for wound infection and by MD.  Patient has known history of alpha-1 antitrypsin deficiency, asthma, several medication allergies, ovarian cysts, GERD, seasonal allergies and hypoglycemia.  Patient states she started noticing a sebaceous cyst on her forehead that she thought was a pimple however it increased in size and started causing facial swelling and pain.  Patient went to urgent care for evaluation and was given doxycycline and discharged.  Patient then started to have more increased swelling and complaints of dizziness and vision changes at which point she returned to urgent care and was told she was having allergic reaction and " given Benadryl and sent home.  Patient then went to her dermatologist who advised her that she had a facial infection and to present to emergency room for further evaluation.  Patient received I&D in the emergency room with packing was initiated on IV antibiotics with vancomycin.  Patient was admitted to medical floor for close monitoring and continued IV fluids and antibiotics.  Patient started to have allergic reaction to vancomycin and antibiotic was discontinued with administration of IV Benadryl.  Plastic surgery was consulted to evaluate wound as it is located on patient's face on the right side above her eyebrow.  Patient was changed to Zyvox IV with no observed allergic reaction.  Plastics evaluated wound and have recommended patient follow-up outpatient in office for further treatment and possible revision of scar at later date.  Patient swelling and drainage decreased and patient felt well enough to discharge.  Patient has follow-up with clinic for her Prolastin therapy and will follow up with PCP and plastics for continued wound management.  Erythema and edema have almost completely diminished and patient to continue Zyvox p.o. for an additional 5 days.  Patient will follow up with PCP.    Therefore, she is discharged in good and stable condition with close outpatient follow-up.    Consultants:      Plastics - Dr. Nelson     Studies:    Imaging/ Testing:      US-EXTREMITY NON VASCULAR UNILATERAL RIGHT   Final Result      7 mm sebaceous cyst.            Laboratory:   Recent Labs      02/07/19   1520  02/08/19   0244  02/09/19   0242   WBC  9.0  7.4  8.0   RBC  4.88  4.57  4.77   HEMOGLOBIN  14.9  13.9  14.3   HEMATOCRIT  44.3  42.2  43.9   MCV  90.8  92.3  92.0   MCH  30.5  30.4  30.0   MCHC  33.6  32.9*  32.6*   RDW  43.4  44.0  43.5   PLATELETCT  233  209  223   MPV  11.1  11.4  11.3       Recent Labs      02/07/19   1520  02/08/19   0244  02/09/19   0242   SODIUM  137  141  138   POTASSIUM  3.8  4.0   "4.2   CHLORIDE  108  109  106   CO2  22  22  26   GLUCOSE  88  78  101*   BUN  13  13  21   CREATININE  0.81  0.86  1.07   CALCIUM  9.7  8.4*  9.6       Recent Labs      02/07/19   1520  02/08/19   0244  02/09/19   0242   ALTSGPT  11   --    --    ASTSGOT  14   --    --    ALKPHOSPHAT  46   --    --    TBILIRUBIN  0.4   --    --    GLUCOSE  88  78  101*                      Results     Procedure Component Value Units Date/Time    BLOOD CULTURE [[id number]] Collected:  02/07/19 1530    Order Status:  Completed Specimen:  Blood from Peripheral Updated:  02/08/19 0836     Significant Indicator NEG     Source BLD     Site PERIPHERAL     Blood Culture No Growth    Note: Blood cultures are incubated for 5 days and  are monitored continuously.Positive blood cultures  are called to the RN and reported as soon as  they are identified.      Narrative:       1 of 2 for Blood Culture x 2 sites order. Per Hospital  Policy: Only change Specimen Src: to \"Line\" if specified by  physician order.    BLOOD CULTURE [[id number]] Collected:  02/07/19 1520    Order Status:  Completed Specimen:  Blood from Peripheral Updated:  02/08/19 0836     Significant Indicator NEG     Source BLD     Site PERIPHERAL     Blood Culture No Growth    Note: Blood cultures are incubated for 5 days and  are monitored continuously.Positive blood cultures  are called to the RN and reported as soon as  they are identified.      Narrative:       2 of 2 blood culture x2  Sites order. Per Hospital Policy:  Only change Specimen Src: to \"Line\" if specified by physician  order.          Blood Culture   Date Value Ref Range Status   02/07/2019   Preliminary    No Growth    Note: Blood cultures are incubated for 5 days and  are monitored continuously.Positive blood cultures  are called to the RN and reported as soon as  they are identified.          Procedures/Surgeries:        I&D    Disposition:    Discharge home    Condition:  Stable    Instructions:   Activity: As " tolerated.  Diet: As tolerated   Followup: With PCP and plastic surgery as needed   Instructions:  -Please take all medications as prescribed   -Given instructions to return to the ER if any new or worsening symptoms, worsening condition, or failure to improve  -Call PCP for followup  -No smoking, no alcohol, no caffeine  -Encourage risk factor reduction with tobacco and alcohol abstinence, diet changes, weight loss, and exercise.     Follow-Up  IVIS PLASCENCIA MD  [address]  [place] [facility] [address]  [phone]            Discharge Medications:             Medication List      START taking these medications      Instructions   ibuprofen 400 MG Tabs  Commonly known as:  MOTRIN   Take 1 Tab by mouth every 6 hours as needed for Mild Pain.  Dose:  400 mg     linezolid 600 MG Tabs  Commonly known as:  ZYVOX   Take 1 Tab by mouth every 12 hours for 5 days.  Dose:  600 mg        CONTINUE taking these medications      Instructions   alpha-1-proteinase inhibitor 500 MG Solr  Commonly known as:  PROLASTIN   4,880 mg by Intravenous route every 7 days. Every Wednesday or Friday, depending on preference of Pt  Dose:  4880 mg     budesonide-formoterol 160-4.5 MCG/ACT Aero  Commonly known as:  SYMBICORT   Inhale 2 Puffs by mouth 2 Times a Day.  Dose:  2 Puff     cetirizine 10 MG Tabs  Commonly known as:  ZYRTEC   Take 10 mg by mouth 2 Times a Day.  Dose:  10 mg     diphenhydrAMINE 25 MG Tabs  Commonly known as:  BENADRYL   Take 25 mg by mouth at bedtime as needed for Sleep.  Dose:  25 mg     etonogestrel 68 MG Impl implant  Commonly known as:  NEXPLANON   Inject 1 Each as instructed Once.  Dose:  1 Each     fluticasone 50 MCG/ACT nasal spray  Commonly known as:  FLONASE   Spray 1 Spray in nose as needed.  Dose:  1 Spray     ipratropium 17 MCG/ACT Aers  Commonly known as:  ATROVENT   Inhale 2 Puffs by mouth every 6 hours as needed (SOB).  Dose:  2 Puff     levalbuterol 1.25 MG/3ML Nebu  Commonly known as:  XOPENEX   1.25 mg by  Nebulization route every four hours as needed for Shortness of Breath.  Dose:  1.25 mg     montelukast 10 MG Tabs  Commonly known as:  SINGULAIR   Take 10 mg by mouth every evening.  Dose:  10 mg        STOP taking these medications    doxycycline 100 MG Tabs  Commonly known as:  VIBRAMYCIN              Total time of the discharge process exceeds 38 minutes. This included face to face with the patient, medication reconciliation, care co ordination with nursing  involved in patient care and discussion and co ordination with case management.     Please CC the above physicians    VERONICA Lopez  2/9/2019  11:39 AM

## 2019-02-09 NOTE — CARE PLAN
Problem: Discharge Barriers/Planning  Goal: Patient's continuum of care needs will be met    Intervention: Involve patient and significant other/support system in setting and prioritizing goals for hospital stay and discharge  Discussed discharge  Intervention: Collaborate with Transitional Care Team and Interdisciplinary Team to meet discharge needs  Informed MD that pt and family wished for discharge today

## 2019-02-10 NOTE — ED NOTES
Patient was educated on discharge instructions.  Patient was informed about diagnosis, symptom management, risks, and home care instructions.  Patient verbalized understanding and signed discharge instructions. Prescriptions handed to patient. Copy of discharge instructions in chart.  Patient ambulated out with steady gait.  Patient has personal belongings and PIV removed, tip intact.

## 2019-02-10 NOTE — DISCHARGE INSTRUCTIONS
Discontinue Zyvox and start doxycycline    Continue with Benadryl 25 mg every 4-6 hours as needed for allergic reaction    Follow-up with your allergist  Follow-up with plastic surgery for recheck of your wound     Return to the ER for difficulty breathing, swallowing, vomiting, or other concerns

## 2019-02-12 NOTE — CONSULTS
DATE OF SERVICE:  02/07/2019    REQUESTING PHYSICIAN:  Justin Murphy MD    REASON FOR CONSULTATION:  Forehead abscess with facial swelling.    HISTORY OF PRESENT ILLNESS:  This is a 19-year-old female who has had an   approximate 5-day history of a lesion on the right side of her forehead   progressively getting worse.  She thought this started out as a pimple or an   ingrown hair, but has been increasing in size and redness.  She was seen the   other day at an urgent care and placed on doxycycline.  She had continued   redness and swelling and saw her allergist earlier thinking that it was a   reaction to the antibiotics because she has multiple antibiotic allergies.  He   felt that this was more likely an infection and told her to come to the   emergency department for further evaluation.  She states that the area did   spontaneously opened about 2 days ago and drained some purulent material, but   it has since scabbed over and reaccumulated with increased swelling and   redness.    PAST MEDICAL HISTORY:  Alpha 1 antitrypsin deficiency, asthma, hypoglycemia,   and gastroesophageal reflux disease.    MEDICATIONS:  Prolastin 500 mg, Symbicort, Zyrtec, Benadryl, Flonase,   Atrovent, Xopenex, Singulair, and doxycycline.    ALLERGIES:  TO IODINE, SULFA DRUGS, FENTANYL, CEFUROXIME, CLINDAMYCIN,   GLUTENS, MORPHINE, PENICILLINS, AND PREDNISONE.    PAST SURGICAL HISTORY:  Inguinal hernia repair, appendectomy, ethmoidectomy,   sphenoidectomy, and turbinoplasty.    SOCIAL HISTORY:  Patient is a nonsmoker and does not use alcohol or drugs.    She is a student at [employer].    PHYSICAL EXAMINATION:  VITAL SIGNS:  Blood pressure 110/68, pulse rate 80, and temperature is 96.6  GENERAL:  A well-developed, well-nourished female in no acute distress, seen   in the emergency department.  HEENT:  The patient has an approximate 1x1 cm area of fluctuance just over her   right medial eyebrow.  There are surrounding erythema, with  cellulitis and   swelling extending along the right periorbital region.  There is tenderness   immediately over the area of fluctuance, but otherwise no facial trauma.    Oropharynx is clear.  NECK:  Supple without adenopathy.  CHEST:  Lungs are clear to auscultation bilaterally.  HEART:  Regular rate and rhythm.  ABDOMEN:  Soft and nontender.  SKIN:  Otherwise, warm and dry.  NEUROLOGIC:  Alert and oriented x3 with no focal motor deficits.    ASSESSMENT AND PLAN:  A 19-year-old female with a forehead abscess and   associated surrounding facial cellulitis.  She is being admitted to the   hospitalist service and started on IV antibiotics.  She actually just received   a dose of vancomycin and appears to be having an allergic reaction to this as   well.  Because there does appear to be more purulent material in the area, I   have recommended performing an incision and drainage in the emergency   department with local anesthetic.  If we can remove the source of the   infection, then the IV antibiotics should cover the remainder.  She has agreed   to this.  I will perform the incision and drainage with local anesthetic and   then she will be admitted.  Then, I will monitor her while in the hospital.       ____________________________________     IVIS PLASCENCIA MD PSM / NTS    DD:  02/12/2019 11:18:59  DT:  02/12/2019 11:51:28    D#:  [id number]  Job#:  [id number]

## 2019-02-12 NOTE — PROCEDURES
DATE OF SERVICE:  02/07/2019    PROCEDURE:  Incision and drainage of right forehead abscess.    INDICATIONS:  Right forehead abscess with surrounding cellulitis.    SURGEON:  Sonido Nelson MD    ASSISTANT:  None.    ANESTHESIA:  1% lidocaine.    BRIEF HISTORY:  This is a 19-year-old female with a 5-day history of worsening   swelling and redness to her forehead.  She did have an area that is   spontaneously opened and drained some purulent material, but then has scabbed   over and reaccumulated.  She now presents to the emergency department with   worsening cellulitis especially around her right eye.    PROCEDURE IN DETAIL:  I first anesthetized the area using a   supraorbital/supratrochlear nerve block with 1% lidocaine with epinephrine.    After allowing adequate time for anesthesia to take place, I prepped the area   with alcohol.  The patient was allergic to BETADINE.  Using forceps, I removed   the scab and entered the area, breaking up some loculations.  There was an   immediate egress of purulent material, which was sent for culture.  I   irrigated the area with sterile saline and packed it with quarter inch   iodoform gauze followed by a gauze pad.  The patient tolerated the procedure   well.       ____________________________________     SONIDO NELSON MD    PSM / NTS    DD:  02/12/2019 11:21:26  DT:  02/12/2019 11:36:56    D#:  [id number]  Job#:  [id number]

## 2019-08-01 ENCOUNTER — OFFICE VISIT (OUTPATIENT)
Dept: URGENT CARE | Facility: CLINIC | Age: 19
End: 2019-08-01
Payer: COMMERCIAL

## 2019-08-01 VITALS
OXYGEN SATURATION: 99 % | BODY MASS INDEX: 22.58 KG/M2 | WEIGHT: 115 LBS | HEIGHT: 60 IN | TEMPERATURE: 99.1 F | HEART RATE: 97 BPM | SYSTOLIC BLOOD PRESSURE: 92 MMHG | RESPIRATION RATE: 16 BRPM | DIASTOLIC BLOOD PRESSURE: 60 MMHG

## 2019-08-01 DIAGNOSIS — J20.9 ACUTE BRONCHITIS, UNSPECIFIED ORGANISM: ICD-10-CM

## 2019-08-01 PROCEDURE — 99214 OFFICE O/P EST MOD 30 MIN: CPT | Performed by: FAMILY MEDICINE

## 2019-08-01 RX ORDER — AZITHROMYCIN 250 MG/1
TABLET, FILM COATED ORAL
Qty: 6 TAB | Refills: 0 | Status: SHIPPED | OUTPATIENT
Start: 2019-08-01 | End: 2019-10-17

## 2019-08-02 NOTE — PROGRESS NOTES
Subjective:      Joan Jesus is a 19 y.o. female who presents with Cough (pain in the chest, keep coughing and possible upper respiratiry infection started 1 week ago)            This is a new problem.  19-year-old with history of alpha 1 antitrypsin deficiency presenting for 1 week history of cough which is getting more productive.  Denies any fever chills.  She gets a weekly injection which was denied 2 weeks ago by her insurance but recently got approved.  She received her injection today.  She denies any sore throat or ear pain.  She does use her Xopenex every now and then, last use last night.  She has not used one today.  Cough is somewhat productive.  No ill contact exposure reported.  Review of system otherwise negative.  She has had good response with clarithromycin or azithromycin or doxycycline in the past.      Review of Systems   All other systems reviewed and are negative.         Objective:     BP (!) 92/60   Pulse 97   Temp 37.3 °C (99.1 °F)   Resp 16   Ht 1.524 m (5')   Wt 52.2 kg (115 lb)   SpO2 99%   BMI 22.46 kg/m²      Physical Exam   Constitutional: She is oriented to person, place, and time. She appears well-developed and well-nourished.  Non-toxic appearance. No distress.   HENT:   Head: Normocephalic and atraumatic.   Right Ear: Tympanic membrane, external ear and ear canal normal.   Left Ear: Tympanic membrane, external ear and ear canal normal.   Nose: Nose normal.   Mouth/Throat: Uvula is midline and oropharynx is clear and moist. No oral lesions. No trismus in the jaw. No uvula swelling. No oropharyngeal exudate, posterior oropharyngeal edema, posterior oropharyngeal erythema or tonsillar abscesses. No tonsillar exudate.   Eyes: Conjunctivae are normal.   Neck: Neck supple.   Cardiovascular: Normal rate and regular rhythm. Exam reveals no gallop and no friction rub.   No murmur heard.  Pulmonary/Chest: Effort normal. No stridor. No respiratory distress. She has no  wheezes. She has no rales.   She coughs frequently during the exam on deep inspiration but no wheezing heard.   Lymphadenopathy:     She has no cervical adenopathy.   Neurological: She is alert and oriented to person, place, and time.   Skin: Skin is warm. No rash noted. She is not diaphoretic. No erythema. No pallor.   Psychiatric: She has a normal mood and affect.               Assessment/Plan:     1. Acute bronchitis, unspecified organism  - azithromycin (ZITHROMAX) 250 MG Tab; 500mg on day One, then 250mg daily until finished  Dispense: 6 Tab; Refill: 0    Offered to give her albuterol but she prefers Xopenex which we do not have  She will give herself a Xopenex nebulizer once she gets home.  We will start azithromycin, over-the-counter cough medication discussed and warning signs reviewed.  Plan per orders and instructions

## 2019-08-02 NOTE — PATIENT INSTRUCTIONS
Start oral antibiotic as directed  Over-the-counter cough medication as needed  Use of bronchodilator every 4 hours as needed  Follow up if not significantly improved as expected, sooner if any worsening or new symptoms

## 2019-10-17 ENCOUNTER — OFFICE VISIT (OUTPATIENT)
Dept: URGENT CARE | Facility: CLINIC | Age: 19
End: 2019-10-17
Payer: COMMERCIAL

## 2019-10-17 VITALS
HEIGHT: 60 IN | TEMPERATURE: 98.7 F | OXYGEN SATURATION: 95 % | RESPIRATION RATE: 12 BRPM | BODY MASS INDEX: 21.95 KG/M2 | WEIGHT: 111.8 LBS | SYSTOLIC BLOOD PRESSURE: 102 MMHG | DIASTOLIC BLOOD PRESSURE: 68 MMHG | HEART RATE: 104 BPM

## 2019-10-17 DIAGNOSIS — R53.81 MALAISE AND FATIGUE: ICD-10-CM

## 2019-10-17 DIAGNOSIS — R53.83 MALAISE AND FATIGUE: ICD-10-CM

## 2019-10-17 PROCEDURE — 99214 OFFICE O/P EST MOD 30 MIN: CPT | Performed by: FAMILY MEDICINE

## 2019-10-17 ASSESSMENT — ENCOUNTER SYMPTOMS: SORE THROAT: 1

## 2019-10-17 NOTE — LETTER
October 17, 2019         Patient: Joan Jesus   YOB: 2000   Date of Visit: 10/17/2019           To Whom it May Concern:    Joan Jesus was seen in my clinic on 10/17/2019. She may return to school in 1-3 days, excuse recent missed days school this week.    If you have any questions or concerns, please don't hesitate to call.        Sincerely,           Jovany Aldridge M.D.  Electronically Signed

## 2019-10-18 ENCOUNTER — HOSPITAL ENCOUNTER (OUTPATIENT)
Dept: LAB | Facility: MEDICAL CENTER | Age: 19
End: 2019-10-18
Attending: FAMILY MEDICINE
Payer: COMMERCIAL

## 2019-10-18 DIAGNOSIS — R53.81 MALAISE AND FATIGUE: ICD-10-CM

## 2019-10-18 DIAGNOSIS — R53.83 MALAISE AND FATIGUE: ICD-10-CM

## 2019-10-18 LAB
ALBUMIN SERPL BCP-MCNC: 4.4 G/DL (ref 3.2–4.9)
ALBUMIN/GLOB SERPL: 1.9 G/DL
ALP SERPL-CCNC: 46 U/L (ref 30–99)
ALT SERPL-CCNC: 13 U/L (ref 2–50)
ANION GAP SERPL CALC-SCNC: 5 MMOL/L (ref 0–11.9)
AST SERPL-CCNC: 14 U/L (ref 12–45)
BASOPHILS # BLD AUTO: 0.4 % (ref 0–1.8)
BASOPHILS # BLD: 0.03 K/UL (ref 0–0.12)
BILIRUB SERPL-MCNC: 0.5 MG/DL (ref 0.1–1.5)
BUN SERPL-MCNC: 16 MG/DL (ref 8–22)
CALCIUM SERPL-MCNC: 9.5 MG/DL (ref 8.5–10.5)
CHLORIDE SERPL-SCNC: 107 MMOL/L (ref 96–112)
CO2 SERPL-SCNC: 26 MMOL/L (ref 20–33)
CREAT SERPL-MCNC: 0.98 MG/DL (ref 0.5–1.4)
EOSINOPHIL # BLD AUTO: 0.15 K/UL (ref 0–0.51)
EOSINOPHIL NFR BLD: 1.8 % (ref 0–6.9)
ERYTHROCYTE [DISTWIDTH] IN BLOOD BY AUTOMATED COUNT: 42.6 FL (ref 35.9–50)
GLOBULIN SER CALC-MCNC: 2.3 G/DL (ref 1.9–3.5)
GLUCOSE SERPL-MCNC: 123 MG/DL (ref 65–99)
HCT VFR BLD AUTO: 48.1 % (ref 37–47)
HGB BLD-MCNC: 15.5 G/DL (ref 12–16)
IMM GRANULOCYTES # BLD AUTO: 0.02 K/UL (ref 0–0.11)
IMM GRANULOCYTES NFR BLD AUTO: 0.2 % (ref 0–0.9)
LYMPHOCYTES # BLD AUTO: 1.68 K/UL (ref 1–4.8)
LYMPHOCYTES NFR BLD: 20.4 % (ref 22–41)
MCH RBC QN AUTO: 29.1 PG (ref 27–33)
MCHC RBC AUTO-ENTMCNC: 32.2 G/DL (ref 33.6–35)
MCV RBC AUTO: 90.4 FL (ref 81.4–97.8)
MONOCYTES # BLD AUTO: 0.48 K/UL (ref 0–0.85)
MONOCYTES NFR BLD AUTO: 5.8 % (ref 0–13.4)
NEUTROPHILS # BLD AUTO: 5.87 K/UL (ref 2–7.15)
NEUTROPHILS NFR BLD: 71.4 % (ref 44–72)
NRBC # BLD AUTO: 0 K/UL
NRBC BLD-RTO: 0 /100 WBC
PLATELET # BLD AUTO: 277 K/UL (ref 164–446)
PMV BLD AUTO: 11.4 FL (ref 9–12.9)
POTASSIUM SERPL-SCNC: 3.9 MMOL/L (ref 3.6–5.5)
PROT SERPL-MCNC: 6.7 G/DL (ref 6–8.2)
RBC # BLD AUTO: 5.32 M/UL (ref 4.2–5.4)
SODIUM SERPL-SCNC: 138 MMOL/L (ref 135–145)
TSH SERPL DL<=0.005 MIU/L-ACNC: 1.2 UIU/ML (ref 0.38–5.33)
WBC # BLD AUTO: 8.2 K/UL (ref 4.8–10.8)

## 2019-10-18 PROCEDURE — 86664 EPSTEIN-BARR NUCLEAR ANTIGEN: CPT

## 2019-10-18 PROCEDURE — 36415 COLL VENOUS BLD VENIPUNCTURE: CPT

## 2019-10-18 PROCEDURE — 80053 COMPREHEN METABOLIC PANEL: CPT

## 2019-10-18 PROCEDURE — 87536 HIV-1 QUANT&REVRSE TRNSCRPJ: CPT

## 2019-10-18 PROCEDURE — 86663 EPSTEIN-BARR ANTIBODY: CPT

## 2019-10-18 PROCEDURE — 85025 COMPLETE CBC W/AUTO DIFF WBC: CPT

## 2019-10-18 PROCEDURE — 86665 EPSTEIN-BARR CAPSID VCA: CPT | Mod: 91

## 2019-10-18 PROCEDURE — 84443 ASSAY THYROID STIM HORMONE: CPT

## 2019-10-18 NOTE — PROGRESS NOTES
"Subjective:      Joan Jesus is a 19 y.o. female who presents with Fatigue (x 3 week.  Pt. states that she has fatigue and swollen lymph nodes in her neck.  Pt. said she was tested for mono and strep throat at [facility]  and it was negative.  They gave her a Zpack.  She Alpha 1 anti tripsen deficiency.)      - This is a pleasant and non toxic appearing 19 y.o. female with c/o 3 wks w/ malaise fatigue. Started w/ sore throat. Seen in [facility] and said put on abx and tested negative for mono and strep. Sore throat resolved but still w/ malaise and fatigue             ALLERGIES:  Iodine; Sulfa drugs; Fentanyl; Cefuroxime; Clindamycin; Gluten meal; Morphine; Other misc; Pcn [penicillins]; Prednisone; Vancomycin; and Zyvox [linezolid]     PMH:  Past Medical History:   Diagnosis Date   • Alpha-1-antitrypsin deficiency (HCC)    • Asthma    • Dental disorder     permanent retainer lower    • Food allergy     \"jalepenos make my lips and face swell\"   • Gynecological disorder     ovarian cysts   • Heart burn     resolved   • Hypoglycemia    • Seasonal allergies         PSH:  Past Surgical History:   Procedure Laterality Date   • TONSILLECTOMY AND ADENOIDECTOMY N/A 5/25/2018    Procedure: TONSILLECTOMY AND ADENOIDECTOMY;  Surgeon: Luiza Callahan M.D.;  Location: [facility];  Service: Ent   • ANTROSTOMY Bilateral 3/25/2016    Procedure: ANTROSTOMY - ENDOSCOPIC MAXILLARY W/FRONTAL SINUS EXPLORATION;  Surgeon: Luiza Callahan M.D.;  Location: [facility];  Service:    • ETHMOIDECTOMY Bilateral 3/25/2016    Procedure: ETHMOIDECTOMY - ENDOSCOPIC ;  Surgeon: Luiza Callahan M.D.;  Location: [facility];  Service:    • SPHENOIDECTOMY Bilateral 3/25/2016    Procedure: SPHENOIDOTOMY;  Surgeon: Luiza Callahan M.D.;  Location: [facility];  Service:    • TURBINOPLASTY Bilateral 3/25/2016    Procedure: TURBINOPLASTY;  Surgeon: Luiza Callahan M.D.;  " Location: SURGERY [facility];  Service:    • APPENDECTOMY CHILD  2013   • INGUINAL HERNIA REPAIR  2004   • OTHER      Alpha 1 Anti tripson deficiency       MEDS:    Current Outpatient Medications:   •  EPINEPHrine (EPIPEN 2-MALLORY) 0.3 MG/0.3ML Solution Auto-injector solution for injection, 0.3 mL by Intramuscular route Once PRN (Severe allergic reaction) for up to 1 dose., Disp: 1 Each, Rfl: 3  •  ipratropium (ATROVENT) 17 MCG/ACT Aero Soln, Inhale 2 Puffs by mouth every 6 hours as needed (SOB)., Disp: , Rfl:   •  alpha-1-proteinase inhibitor (PROLASTIN) 500 MG Recon Soln, 4,880 mg by Intravenous route every 7 days. Every Wednesday or Friday, depending on preference of Pt, Disp: , Rfl:   •  etonogestrel (NEXPLANON) 68 MG Implant implant, Inject 1 Each as instructed Once., Disp: , Rfl:   •  budesonide-formoterol (SYMBICORT) 160-4.5 MCG/ACT Aerosol, Inhale 2 Puffs by mouth 2 Times a Day., Disp: , Rfl:   •  montelukast (SINGULAIR) 10 MG Tab, Take 10 mg by mouth every evening., Disp: , Rfl:   •  cetirizine (ZYRTEC) 10 MG Tab, Take 10 mg by mouth 2 Times a Day., Disp: , Rfl:   •  fluticasone (FLONASE) 50 MCG/ACT nasal spray, Spray 1 Spray in nose as needed., Disp: , Rfl:   •  levalbuterol (XOPENEX) 1.25 MG/3ML Nebu Soln, 1.25 mg by Nebulization route every four hours as needed for Shortness of Breath., Disp: , Rfl:     ** I have documented what I find to be significant in regards to past medical, social, family and surgical history  in my HPI or under PMH/PSH/FH review section, otherwise it is contributory **           HPI    Review of Systems   Constitutional: Positive for malaise/fatigue.   HENT: Positive for sore throat.    All other systems reviewed and are negative.         Objective:     /68   Pulse (!) 104   Temp 37.1 °C (98.7 °F) (Temporal)   Resp 12   Ht 1.524 m (5')   Wt 50.7 kg (111 lb 12.8 oz)   LMP  (LMP Unknown)   SpO2 95%   BMI 21.83 kg/m²      Physical Exam   Constitutional: She appears  well-developed and well-nourished. No distress.   HENT:   Head: Normocephalic and atraumatic.   Mouth/Throat: Oropharynx is clear and moist.   Eyes: Conjunctivae are normal.   Cardiovascular: Normal heart sounds.   No murmur heard.  Pulmonary/Chest: Effort normal and breath sounds normal. No respiratory distress.   Lymphadenopathy:     She has cervical adenopathy.   Neurological: She is alert. She exhibits normal muscle tone.   Skin: Skin is warm and dry. She is not diaphoretic.   Psychiatric: She has a normal mood and affect. Judgment normal.   Nursing note and vitals reviewed.              Assessment/Plan:         1. Malaise and fatigue  CBC WITH DIFFERENTIAL    Comp Metabolic Panel    TSH WITH REFLEX TO FT4    EBV ACUTE INFECTION AB PANEL    HIV RNA QUANT BY PCR       - rest/hydrate       Dx & d/c instructions discussed w/ patient and/or family members.     Follow up with PCP (or here if PCP unavailable) in 2-3 days if symptoms not improving, ER if feeling/getting worse.    Any realistic and/or common medication side effects that may have been given today(i.e. Rash, GI upset/constipation, sedation, elevation of BP or blood sugars) reviewed.     Patient left in stable condition

## 2019-10-20 LAB
EBV EA-D IGG SER-ACNC: <5 U/ML (ref 0–10.9)
EBV NA IGG SER IA-ACNC: 161 U/ML (ref 0–21.9)
EBV VCA IGG SER IA-ACNC: 165 U/ML (ref 0–21.9)
EBV VCA IGM SER IA-ACNC: <10 U/ML (ref 0–43.9)

## 2019-10-23 LAB
HIV-1 NAAT (COPIES/ML) L204479A: NOT DETECTED CPY/ML
HIV-1 NAAT (LOG COPIES/ML) L295410: NOT DETECTED LOG CPY/ML
HIV1 RNA SERPL QL NAA+PROBE: NOT DETECTED

## 2019-10-26 ENCOUNTER — OFFICE VISIT (OUTPATIENT)
Dept: URGENT CARE | Facility: CLINIC | Age: 19
End: 2019-10-26
Payer: COMMERCIAL

## 2019-10-26 VITALS
HEIGHT: 60 IN | RESPIRATION RATE: 16 BRPM | BODY MASS INDEX: 21.32 KG/M2 | WEIGHT: 108.6 LBS | TEMPERATURE: 97.8 F | DIASTOLIC BLOOD PRESSURE: 60 MMHG | SYSTOLIC BLOOD PRESSURE: 102 MMHG | HEART RATE: 108 BPM | OXYGEN SATURATION: 94 %

## 2019-10-26 DIAGNOSIS — R68.89 FLU-LIKE SYMPTOMS: ICD-10-CM

## 2019-10-26 DIAGNOSIS — J22 LRTI (LOWER RESPIRATORY TRACT INFECTION): ICD-10-CM

## 2019-10-26 LAB
FLUAV+FLUBV AG SPEC QL IA: NEGATIVE
INT CON NEG: NEGATIVE
INT CON POS: POSITIVE

## 2019-10-26 PROCEDURE — 87804 INFLUENZA ASSAY W/OPTIC: CPT | Performed by: PHYSICIAN ASSISTANT

## 2019-10-26 PROCEDURE — 99214 OFFICE O/P EST MOD 30 MIN: CPT | Performed by: PHYSICIAN ASSISTANT

## 2019-10-26 RX ORDER — DOXYCYCLINE HYCLATE 100 MG
100 TABLET ORAL 2 TIMES DAILY
Qty: 20 TAB | Refills: 0 | Status: SHIPPED | OUTPATIENT
Start: 2019-10-26 | End: 2019-11-05

## 2019-10-26 ASSESSMENT — ENCOUNTER SYMPTOMS
FEVER: 1
RHINORRHEA: 1
SPUTUM PRODUCTION: 0
WHEEZING: 1
SORE THROAT: 1
COUGH: 1
FOCAL WEAKNESS: 0
TINGLING: 0
DIARRHEA: 0
CHILLS: 1
NAUSEA: 0
MYALGIAS: 1
SENSORY CHANGE: 0
HEADACHES: 1
PALPITATIONS: 0
VOMITING: 0
SHORTNESS OF BREATH: 0
SINUS PAIN: 1
ABDOMINAL PAIN: 0

## 2019-10-26 NOTE — PROGRESS NOTES
"Subjective:      Joan Jesus is a 19 y.o. female who presents with Flu Like Symptoms (x monday ) and URI (x monday )            Cough   This is a new problem. Episode onset: 6 days  The problem has been unchanged. The cough is non-productive. Associated symptoms include chills, a fever, headaches, myalgias, nasal congestion, rhinorrhea (purulent green mucous ), a sore throat and wheezing. Pertinent negatives include no chest pain, ear congestion, ear pain, postnasal drip, rash or shortness of breath. She has tried a beta-agonist inhaler for the symptoms. Her past medical history is significant for asthma and pneumonia. There is no history of bronchitis. Alpha-1-Antitrypsion deficiency     Past Medical History:   Diagnosis Date   • Alpha-1-antitrypsin deficiency (HCC)    • Asthma    • Dental disorder     permanent retainer lower    • Food allergy     \"jalepenos make my lips and face swell\"   • Gynecological disorder     ovarian cysts   • Heart burn     resolved   • Hypoglycemia    • Seasonal allergies        Past Surgical History:   Procedure Laterality Date   • TONSILLECTOMY AND ADENOIDECTOMY N/A 5/25/2018    Procedure: TONSILLECTOMY AND ADENOIDECTOMY;  Surgeon: Luiza Callahan M.D.;  Location: [facility];  Service: Ent   • ANTROSTOMY Bilateral 3/25/2016    Procedure: ANTROSTOMY - ENDOSCOPIC MAXILLARY W/FRONTAL SINUS EXPLORATION;  Surgeon: Luiza Callahan M.D.;  Location: [facility];  Service:    • ETHMOIDECTOMY Bilateral 3/25/2016    Procedure: ETHMOIDECTOMY - ENDOSCOPIC ;  Surgeon: Luiza Callahan M.D.;  Location: [facility];  Service:    • SPHENOIDECTOMY Bilateral 3/25/2016    Procedure: SPHENOIDOTOMY;  Surgeon: Luiza Callahan M.D.;  Location: [facility];  Service:    • TURBINOPLASTY Bilateral 3/25/2016    Procedure: TURBINOPLASTY;  Surgeon: Luiza Callahan M.D.;  Location: [facility];  Service:    • APPENDECTOMY CHILD " " 2013   • INGUINAL HERNIA REPAIR  2004   • OTHER      Alpha 1 Anti tripson deficiency       Family History   Problem Relation Age of Onset   • Hypertension Mother    • Other Mother    • Asthma Father          Allergies   Allergen Reactions   • Iodine Anaphylaxis   • Sulfa Drugs Anaphylaxis   • Fentanyl Vomiting   • Cefuroxime Hives   • Clindamycin Hives   • Gluten Meal Diarrhea     Blisters     • Morphine Nausea     Severe nausea   • Other Misc Rash     Electrode pads   • Pcn [Penicillins] Hives   • Prednisone Hives   • Vancomycin Hives, Rash, Itching and Swelling      Hives were present on Right inner thigh, Left upper arm, back, facial swelling was present and pt reports generalized itching.   • Zyvox [Linezolid]      \"throat swelling, lips tingling/itching, dizzy\"       Medications, Allergies, and current problem list reviewed today in Epic      Review of Systems   Constitutional: Positive for chills, fever and malaise/fatigue.   HENT: Positive for congestion, rhinorrhea (purulent green mucous ), sinus pain and sore throat. Negative for ear discharge, ear pain and postnasal drip.    Respiratory: Positive for cough and wheezing. Negative for sputum production and shortness of breath.    Cardiovascular: Negative for chest pain, palpitations and leg swelling.   Gastrointestinal: Negative for abdominal pain, diarrhea, nausea and vomiting.   Musculoskeletal: Positive for myalgias.   Skin: Negative for rash.   Neurological: Positive for headaches. Negative for tingling, sensory change and focal weakness.     All other systems reviewed and are negative.        Objective:     /60   Pulse (!) 108   Temp 36.6 °C (97.8 °F) (Temporal)   Resp 16   Ht 1.524 m (5')   Wt 49.3 kg (108 lb 9.6 oz)   LMP  (LMP Unknown)   SpO2 94%   BMI 21.21 kg/m²      Physical Exam   Constitutional: She is oriented to person, place, and time. She appears well-developed and well-nourished. No distress.   HENT:   Head: Normocephalic and " atraumatic.   Right Ear: Tympanic membrane, external ear and ear canal normal.   Left Ear: Tympanic membrane, external ear and ear canal normal.   Nose: Mucosal edema and rhinorrhea present. Right sinus exhibits maxillary sinus tenderness. Left sinus exhibits maxillary sinus tenderness.   Mouth/Throat: Uvula is midline, oropharynx is clear and moist and mucous membranes are normal. No tonsillar exudate.   Neck: Neck supple.   Cardiovascular: Normal rate, regular rhythm and normal heart sounds. Exam reveals no gallop and no friction rub.   No murmur heard.  Pulmonary/Chest: Effort normal and breath sounds normal. No respiratory distress. She has no wheezes. She has no rales.   Lymphadenopathy:     She has no cervical adenopathy.   Neurological: She is alert and oriented to person, place, and time. No cranial nerve deficit.   Skin: Skin is warm and dry. No rash noted.   Psychiatric: She has a normal mood and affect. Her behavior is normal. Judgment and thought content normal.               Assessment/Plan:     1. Flu-like symptoms  POCT Influenza A/B    doxycycline (VIBRAMYCIN) 100 MG Tab   2. LRTI (lower respiratory tract infection)  doxycycline (VIBRAMYCIN) 100 MG Tab       - POCT Influenza A/B- negative    Viral etiology discussed. Due to co-morbidities and history of pneumonia,.  Will treat with Doxy     Differential diagnoses, Supportive care, and indications for immediate follow-up discussed with patient.   Instructed to return to clinic or nearest emergency department for any change in condition, further concerns, or worsening of symptoms.    The patient demonstrated a good understanding and agreed with the treatment plan.    Nicole Mercado P.A.-C.

## 2020-03-06 ENCOUNTER — TELEPHONE (OUTPATIENT)
Dept: HEALTH INFORMATION MANAGEMENT | Facility: OTHER | Age: 20
End: 2020-03-06

## 2020-03-07 ENCOUNTER — APPOINTMENT (OUTPATIENT)
Dept: RADIOLOGY | Facility: MEDICAL CENTER | Age: 20
End: 2020-03-07
Attending: EMERGENCY MEDICINE
Payer: COMMERCIAL

## 2020-03-07 ENCOUNTER — HOSPITAL ENCOUNTER (EMERGENCY)
Facility: MEDICAL CENTER | Age: 20
End: 2020-03-07
Attending: EMERGENCY MEDICINE
Payer: COMMERCIAL

## 2020-03-07 VITALS
WEIGHT: 108 LBS | OXYGEN SATURATION: 99 % | TEMPERATURE: 98 F | RESPIRATION RATE: 20 BRPM | HEART RATE: 102 BPM | BODY MASS INDEX: 21.2 KG/M2 | SYSTOLIC BLOOD PRESSURE: 100 MMHG | DIASTOLIC BLOOD PRESSURE: 66 MMHG | HEIGHT: 60 IN

## 2020-03-07 DIAGNOSIS — J06.9 UPPER RESPIRATORY TRACT INFECTION, UNSPECIFIED TYPE: ICD-10-CM

## 2020-03-07 DIAGNOSIS — J45.21 MILD INTERMITTENT ASTHMA WITH ACUTE EXACERBATION: ICD-10-CM

## 2020-03-07 LAB
ALBUMIN SERPL BCP-MCNC: 4.2 G/DL (ref 3.2–4.9)
ALBUMIN/GLOB SERPL: 1.8 G/DL
ALP SERPL-CCNC: 36 U/L (ref 30–99)
ALT SERPL-CCNC: 9 U/L (ref 2–50)
ANION GAP SERPL CALC-SCNC: 9 MMOL/L (ref 0–11.9)
AST SERPL-CCNC: 14 U/L (ref 12–45)
BASOPHILS # BLD AUTO: 0.2 % (ref 0–1.8)
BASOPHILS # BLD: 0.02 K/UL (ref 0–0.12)
BILIRUB SERPL-MCNC: 0.7 MG/DL (ref 0.1–1.5)
BUN SERPL-MCNC: 18 MG/DL (ref 8–22)
CALCIUM SERPL-MCNC: 9.2 MG/DL (ref 8.5–10.5)
CHLORIDE SERPL-SCNC: 107 MMOL/L (ref 96–112)
CO2 SERPL-SCNC: 22 MMOL/L (ref 20–33)
CREAT SERPL-MCNC: 0.92 MG/DL (ref 0.5–1.4)
EOSINOPHIL # BLD AUTO: 0.1 K/UL (ref 0–0.51)
EOSINOPHIL NFR BLD: 1.2 % (ref 0–6.9)
ERYTHROCYTE [DISTWIDTH] IN BLOOD BY AUTOMATED COUNT: 44.4 FL (ref 35.9–50)
FLUAV RNA SPEC QL NAA+PROBE: NEGATIVE
FLUBV RNA SPEC QL NAA+PROBE: NEGATIVE
GLOBULIN SER CALC-MCNC: 2.3 G/DL (ref 1.9–3.5)
GLUCOSE SERPL-MCNC: 84 MG/DL (ref 65–99)
HCG SERPL QL: NEGATIVE
HCT VFR BLD AUTO: 41.9 % (ref 37–47)
HGB BLD-MCNC: 14.3 G/DL (ref 12–16)
IMM GRANULOCYTES # BLD AUTO: 0.02 K/UL (ref 0–0.11)
IMM GRANULOCYTES NFR BLD AUTO: 0.2 % (ref 0–0.9)
LYMPHOCYTES # BLD AUTO: 1.35 K/UL (ref 1–4.8)
LYMPHOCYTES NFR BLD: 16.8 % (ref 22–41)
MCH RBC QN AUTO: 30.8 PG (ref 27–33)
MCHC RBC AUTO-ENTMCNC: 34.1 G/DL (ref 33.6–35)
MCV RBC AUTO: 90.1 FL (ref 81.4–97.8)
MONOCYTES # BLD AUTO: 0.66 K/UL (ref 0–0.85)
MONOCYTES NFR BLD AUTO: 8.2 % (ref 0–13.4)
NEUTROPHILS # BLD AUTO: 5.87 K/UL (ref 2–7.15)
NEUTROPHILS NFR BLD: 73.4 % (ref 44–72)
NRBC # BLD AUTO: 0 K/UL
NRBC BLD-RTO: 0 /100 WBC
PLATELET # BLD AUTO: 194 K/UL (ref 164–446)
PMV BLD AUTO: 11 FL (ref 9–12.9)
POTASSIUM SERPL-SCNC: 4 MMOL/L (ref 3.6–5.5)
PROT SERPL-MCNC: 6.5 G/DL (ref 6–8.2)
RBC # BLD AUTO: 4.65 M/UL (ref 4.2–5.4)
SODIUM SERPL-SCNC: 138 MMOL/L (ref 135–145)
WBC # BLD AUTO: 8 K/UL (ref 4.8–10.8)

## 2020-03-07 PROCEDURE — 84703 CHORIONIC GONADOTROPIN ASSAY: CPT

## 2020-03-07 PROCEDURE — 71045 X-RAY EXAM CHEST 1 VIEW: CPT

## 2020-03-07 PROCEDURE — 700111 HCHG RX REV CODE 636 W/ 250 OVERRIDE (IP): Performed by: EMERGENCY MEDICINE

## 2020-03-07 PROCEDURE — 94640 AIRWAY INHALATION TREATMENT: CPT

## 2020-03-07 PROCEDURE — 87502 INFLUENZA DNA AMP PROBE: CPT

## 2020-03-07 PROCEDURE — 80053 COMPREHEN METABOLIC PANEL: CPT

## 2020-03-07 PROCEDURE — 85025 COMPLETE CBC W/AUTO DIFF WBC: CPT

## 2020-03-07 PROCEDURE — 99285 EMERGENCY DEPT VISIT HI MDM: CPT

## 2020-03-07 PROCEDURE — 700101 HCHG RX REV CODE 250: Performed by: EMERGENCY MEDICINE

## 2020-03-07 PROCEDURE — 700105 HCHG RX REV CODE 258: Performed by: EMERGENCY MEDICINE

## 2020-03-07 PROCEDURE — 96374 THER/PROPH/DIAG INJ IV PUSH: CPT

## 2020-03-07 RX ORDER — SODIUM CHLORIDE 9 MG/ML
1000 INJECTION, SOLUTION INTRAVENOUS ONCE
Status: COMPLETED | OUTPATIENT
Start: 2020-03-07 | End: 2020-03-07

## 2020-03-07 RX ORDER — LEVALBUTEROL INHALATION SOLUTION 1.25 MG/3ML
1.25 SOLUTION RESPIRATORY (INHALATION)
Status: COMPLETED | OUTPATIENT
Start: 2020-03-07 | End: 2020-03-07

## 2020-03-07 RX ORDER — METHYLPREDNISOLONE SODIUM SUCCINATE 125 MG/2ML
125 INJECTION, POWDER, LYOPHILIZED, FOR SOLUTION INTRAMUSCULAR; INTRAVENOUS ONCE
Status: COMPLETED | OUTPATIENT
Start: 2020-03-07 | End: 2020-03-07

## 2020-03-07 RX ORDER — METHYLPREDNISOLONE 4 MG/1
TABLET ORAL
Qty: 1 PACKAGE | Refills: 0 | Status: SHIPPED | OUTPATIENT
Start: 2020-03-07 | End: 2023-03-14

## 2020-03-07 RX ADMIN — METHYLPREDNISOLONE SODIUM SUCCINATE 125 MG: 125 INJECTION, POWDER, FOR SOLUTION INTRAMUSCULAR; INTRAVENOUS at 14:48

## 2020-03-07 RX ADMIN — LEVALBUTEROL 1.25 MG: 1.25 SOLUTION RESPIRATORY (INHALATION) at 16:03

## 2020-03-07 RX ADMIN — SODIUM CHLORIDE 1000 ML: 9 INJECTION, SOLUTION INTRAVENOUS at 14:49

## 2020-03-07 ASSESSMENT — FIBROSIS 4 INDEX: FIB4 SCORE: 0.29

## 2020-03-07 NOTE — ED TRIAGE NOTES
Pt arrives via POV, wearing mask, brought in quickly too negative pressure room 27. Pt c/o Sob getting worse since Tuesday/wednesday. Pt recently underwent testing at [facility] department for covid 19. Pt was at a conference in [facility] last Tuesday were some attendants have since tested positive. Pt has been on self isloation at home but came in because SOB was becoming worse. Pt resting comfortably at this time.

## 2020-03-07 NOTE — ED NOTES
All contact with patient has been with full isolation and ppe, patient in yellow mask, full gown, gloves, and PAPR gear. Pt was placed in a negative pressure room upon arrival. All spec's collected, pt given warm blankets, nothing further needed at this time.

## 2020-03-07 NOTE — ED PROVIDER NOTES
ED Provider Note    Scribed for Chery Callahan M.D. by Jyoti Smith. 3/7/2020, 2:21 PM.    Primary care provider: Marcos Dunlap M.D.  Means of arrival: Walk in   History obtained from: Patient   History limited by: None     CHIEF COMPLAINT  Chief Complaint   Patient presents with   • Shortness of Breath       HPI  Joan Jesus is a 20 y.o. female who presents to the Emergency Department for shortness of breath onset 4 days ago. The patient states that she went to [facility] 11 days ago for a conference, and has found out that people who attended the conference tested positive for COVID. She has already contacted the health department and they did a COVID swab. The patient has a history of asthma, and had taken her albuterol inhaler and nebulizer at home for alleviation, but they have not been successful. She reports associated chest tightness. Patient denies vomiting, diarrhea, sore throat, or chest pain.     REVIEW OF SYSTEMS  Pertinent positives include shortness of breath and chest tightness. Pertinent negatives include no vomiting, diarrhea, sore throat, or chest pain. All other systems reviewed and negative.     PAST MEDICAL HISTORY   has a past medical history of Alpha-1-antitrypsin deficiency (HCC), Asthma, Dental disorder, Food allergy, Gynecological disorder, Heart burn, Hypoglycemia, and Seasonal allergies.    SURGICAL HISTORY   has a past surgical history that includes inguinal hernia repair (2004); appendectomy child (2013); antrostomy (Bilateral, 3/25/2016); ethmoidectomy (Bilateral, 3/25/2016); sphenoidectomy (Bilateral, 3/25/2016); turbinoplasty (Bilateral, 3/25/2016); tonsillectomy and adenoidectomy (N/A, 5/25/2018); and other.    SOCIAL HISTORY  Social History     Tobacco Use   • Smoking status: Never Smoker   • Smokeless tobacco: Never Used   Substance Use Topics   • Alcohol use: No     Comment: Alpha 1 Anti tripson deficiency   • Drug use: No      Social History  "    Substance and Sexual Activity   Drug Use No       FAMILY HISTORY  Family History   Problem Relation Age of Onset   • Hypertension Mother    • Other Mother    • Asthma Father        CURRENT MEDICATIONS  Home Medications     Reviewed by Jose Martin Chavarria R.N. (Registered Nurse) on 03/07/20 at 1403  Med List Status: Not Addressed   Medication Last Dose Status   alpha-1-proteinase inhibitor (PROLASTIN) 500 MG Recon Soln 3/4/2020 Active   budesonide-formoterol (SYMBICORT) 160-4.5 MCG/ACT Aerosol 3/7/2020 Active   cetirizine (ZYRTEC) 10 MG Tab 3/6/2020 Active   EPINEPHrine (EPIPEN 2-MALLORY) 0.3 MG/0.3ML Solution Auto-injector solution for injection  Active   etonogestrel (NEXPLANON) 68 MG Implant implant 3/7/2020 Active   ipratropium (ATROVENT) 17 MCG/ACT Aero Soln 3/7/2020 Active   levalbuterol (XOPENEX) 1.25 MG/3ML Nebu Soln 3/7/2020 Active   montelukast (SINGULAIR) 10 MG Tab 3/6/2020 Active                ALLERGIES  Allergies   Allergen Reactions   • Iodine Anaphylaxis   • Sulfa Drugs Anaphylaxis   • Fentanyl Vomiting   • Cefuroxime Hives   • Clindamycin Hives   • Gluten Meal Diarrhea     Blisters     • Morphine Nausea     Severe nausea   • Other Misc Rash     Electrode pads   • Pcn [Penicillins] Hives   • Prednisone Hives   • Vancomycin Hives, Rash, Itching and Swelling      Hives were present on Right inner thigh, Left upper arm, back, facial swelling was present and pt reports generalized itching.   • Zyvox [Linezolid]      \"throat swelling, lips tingling/itching, dizzy\"       PHYSICAL EXAM  VITAL SIGNS: /63   Pulse 86   Resp 20   Ht 1.524 m (5')   Wt 49 kg (108 lb)   SpO2 99%   BMI 21.09 kg/m²     Constitutional: Well developed, No acute distress, Non-toxic appearance.   HENT: Normocephalic, Atraumatic, Bilateral external ears normal, Oropharynx moist, No oral exudates, Nose normal.   Eyes: PERRL, EOMI, Conjunctiva normal  Neck: Normal range of motion, No tenderness, Supple  Cardiovascular: Normal heart " rate, Normal rhythm  Thorax & Lungs: Limited due to protective gear making it difficult to fully auscultate.  Poor air movement, no obvious wheezes or rhonchi.  Abdomen: Benign abdominal exam, no guarding no rebound, no tenderness, no distention  Skin: Warm, Dry, No erythema, No rash.   Back: No tenderness, No CVA tenderness.   Extremities: Intact distal pulses, No edema, No tenderness   Neurologic: Alert & oriented x 3, Normal motor function, Normal sensory function, No focal deficits noted.  Psychiatric: Appropriate                                                DIAGNOSTIC STUDIES / PROCEDURES\    LABS  Results for orders placed or performed during the hospital encounter of 03/07/20   Influenza A/B By PCR (Adult - Flu Only)   Result Value Ref Range    Influenza virus A RNA Negative Negative    Influenza virus B, PCR Negative Negative   CBC WITH DIFFERENTIAL   Result Value Ref Range    WBC 8.0 4.8 - 10.8 K/uL    RBC 4.65 4.20 - 5.40 M/uL    Hemoglobin 14.3 12.0 - 16.0 g/dL    Hematocrit 41.9 37.0 - 47.0 %    MCV 90.1 81.4 - 97.8 fL    MCH 30.8 27.0 - 33.0 pg    MCHC 34.1 33.6 - 35.0 g/dL    RDW 44.4 35.9 - 50.0 fL    Platelet Count 194 164 - 446 K/uL    MPV 11.0 9.0 - 12.9 fL    Neutrophils-Polys 73.40 (H) 44.00 - 72.00 %    Lymphocytes 16.80 (L) 22.00 - 41.00 %    Monocytes 8.20 0.00 - 13.40 %    Eosinophils 1.20 0.00 - 6.90 %    Basophils 0.20 0.00 - 1.80 %    Immature Granulocytes 0.20 0.00 - 0.90 %    Nucleated RBC 0.00 /100 WBC    Neutrophils (Absolute) 5.87 2.00 - 7.15 K/uL    Lymphs (Absolute) 1.35 1.00 - 4.80 K/uL    Monos (Absolute) 0.66 0.00 - 0.85 K/uL    Eos (Absolute) 0.10 0.00 - 0.51 K/uL    Baso (Absolute) 0.02 0.00 - 0.12 K/uL    Immature Granulocytes (abs) 0.02 0.00 - 0.11 K/uL    NRBC (Absolute) 0.00 K/uL   COMP METABOLIC PANEL   Result Value Ref Range    Sodium 138 135 - 145 mmol/L    Potassium 4.0 3.6 - 5.5 mmol/L    Chloride 107 96 - 112 mmol/L    Co2 22 20 - 33 mmol/L    Anion Gap 9.0 0.0 -  11.9    Glucose 84 65 - 99 mg/dL    Bun 18 8 - 22 mg/dL    Creatinine 0.92 0.50 - 1.40 mg/dL    Calcium 9.2 8.5 - 10.5 mg/dL    AST(SGOT) 14 12 - 45 U/L    ALT(SGPT) 9 2 - 50 U/L    Alkaline Phosphatase 36 30 - 99 U/L    Total Bilirubin 0.7 0.1 - 1.5 mg/dL    Albumin 4.2 3.2 - 4.9 g/dL    Total Protein 6.5 6.0 - 8.2 g/dL    Globulin 2.3 1.9 - 3.5 g/dL    A-G Ratio 1.8 g/dL   HCG QUAL SERUM   Result Value Ref Range    Beta-Hcg Qualitative Serum Negative Negative   ESTIMATED GFR   Result Value Ref Range    GFR If African American >60 >60 mL/min/1.73 m 2    GFR If Non African American >60 >60 mL/min/1.73 m 2     All labs reviewed by me.    RADIOLOGY  DX-CHEST-PORTABLE (1 VIEW)   Final Result         No acute cardiac or pulmonary abnormality is identified.        The radiologist's interpretation of all radiological studies have been reviewed by me.    COURSE & MEDICAL DECISION MAKING  Nursing notes, VS, PMSFHx reviewed in chart.     2:21 PM Patient seen and examined at bedside. Wore protective gear in the room. The patient presents with shortness of breath and the differential diagnosis includes but is not limited to influenza vs pneumonia vs asthma exacerbation vs COVID. Ordered for DX chest, CBC with differential, CMP, HCG qual serum, and influenza A/B to evaluate. Patient was treated with NS infusion 1000 ml, xopenex 1.25 mg,  and solu-medrol 125 mg for her symptoms.    Laboratory studies show a normal white count without evidence of bandemia.  There is no evidence of significant electrolyte abnormalities.  Chest x-ray shows no evidence of pneumonia.  Patient received a breathing treatment that made her mildly tachycardic and she feels jittery.  However her lungs sound much better on repeat exam.  She has increased air movement.  She states she is feeling much better.  I also think the IV steroids as well as the fluids likely helped her breathing.  She has had no episodes of hypoxia here and I believe she stable for  outpatient management of her asthma.  She states she has taken a Medrol Dosepak in the past and it does make her feel little agitated but she has no acute allergic reaction to it.  I do not feel she needs antibiotics at this time as I suspect this is likely viral in etiology.  I have advised her to use her inhalers as needed.  She was given strict asthma return precautions.    5:04 PM - Patient was reevaluated at bedside. Discussed lab and radiology results with the patient. She hasn't had an episode of hypoxia and feels improved. Informed patient that she will be discharged home. She understands that she is to remain under quarantine until told otherwise by the health department. Discussed return precautions for asthma.      HYDRATION: Based on the patient's presentation of Tachycardia the patient was given IV fluids. IV Hydration was used because oral hydration was not adequate alone. Upon recheck following hydration, the patient was improved.     The patient will return for new or worsening symptoms and is stable at the time of discharge.    The patient is referred to a primary physician for blood pressure management, diabetic screening, and for all other preventative health concerns.    DISPOSITION:  Patient will be discharged home in stable condition.    FOLLOW UP:  Marcos Dunlap M.D.  [address] [facility] [address]  [phone]    In 2 days  If symptoms worsen, return to the er.      OUTPATIENT MEDICATIONS:  New Prescriptions    METHYLPREDNISOLONE (MEDROL DOSEPAK) 4 MG TABLET THERAPY PACK    Use as directed         FINAL IMPRESSION  1. Mild intermittent asthma with acute exacerbation    2. Upper respiratory tract infection, unspecified type          I, Jyoti Smith (Scribe), am scribing for, and in the presence of, Chery Callahan M.D..    Electronically signed by: Jyoti Ford), 3/7/2020    IChery M.D. personally performed the services described in this  documentation, as scribed by Jyoti Smith in my presence, and it is both accurate and complete. C    The note accurately reflects work and decisions made by me.  Chery Callahan M.D.  3/7/2020  5:50 PM

## 2020-03-07 NOTE — TELEPHONE ENCOUNTER
Called patient back and left message instructing her to call us back at [phone] before 5:00pm and [facility] nurse hotline at [phone] after 5:00pm. Instructed If the patient spoke to [facility] she doesn't need to call unless she has further questions.   SURENDRA

## 2020-03-07 NOTE — ED NOTES
Notified of patient PTA.  Pt to sergio with mom at 1330. Mothers name is Martha Jesus. Scottie Boss and Issa Oneil were both masked and gloved, patient masked to patient to [address].  Per mom, patient has Alpha 1.  Has had positive recent exposure to a patient positive for Covid 19 at a conference in [facility], patient under watch of [facility] and self quarantined.  COVID 19 swab done today at [facility].  Patient has been having increased SOB used xopenenx, atrovent and cymbacort at home with no results.  States allergic to Prednisone.  Solumedrol tends to work in these situations per mom.  Mom aware that patient is to have no visitors but they can communicate via phone.  Recommended to mom that her and her son watch for s/s in themselves and avoid public areas until COVID 19 is ruled out in daughter.  I was wearing a mask during discussion with mom.

## 2020-03-08 NOTE — ED NOTES
Pt was placed in mask and walked up to triage. pts mother is <5 minutes away. Pt placed in chair away from lobby and other people. In view of vehicle  area. Pt states she is aware of need for self isolation and understand needs to keep mask on and stay clear of people until health department contacts patient for further. Pt has prescriptions and all follow up dc info.

## 2020-03-08 NOTE — DISCHARGE INSTRUCTIONS
You are to remain quarantined at home until the health department clears you.  Concerning your asthma take the medication as directed.  Also continue using your inhaler as needed.  Any worsening symptoms, difficulty breathing or your inhalers not working return for reevaluation.

## 2020-03-08 NOTE — FLOWSHEET NOTE
03/07/20 1604   Events/Summary/Plan   Events/Summary/Plan no distress noted. pt on RA sat 98%   Vital Signs   Pulse 98   Respiration 16   Pulse Oximetry 98 %   Breath Sounds   RUL Breath Sounds Diminished   RML Breath Sounds Diminished   RLL Breath Sounds Diminished   NICHOLAS Breath Sounds Diminished   LLL Breath Sounds Diminished   Oxygen   O2 (LPM) 0   FiO2% 21 %   O2 Delivery Device None - Room Air

## 2023-01-03 ENCOUNTER — OFFICE VISIT (OUTPATIENT)
Dept: URGENT CARE | Facility: CLINIC | Age: 23
End: 2023-01-03
Payer: COMMERCIAL

## 2023-01-03 VITALS
HEART RATE: 116 BPM | OXYGEN SATURATION: 97 % | DIASTOLIC BLOOD PRESSURE: 68 MMHG | RESPIRATION RATE: 12 BRPM | WEIGHT: 129 LBS | SYSTOLIC BLOOD PRESSURE: 112 MMHG | BODY MASS INDEX: 25.19 KG/M2 | TEMPERATURE: 99.3 F

## 2023-01-03 DIAGNOSIS — J06.9 VIRAL URI: ICD-10-CM

## 2023-01-03 DIAGNOSIS — R09.81 SINUS CONGESTION: ICD-10-CM

## 2023-01-03 LAB
EXTERNAL QUALITY CONTROL: NORMAL
FLUAV+FLUBV AG SPEC QL IA: NEGATIVE
INT CON NEG: NORMAL
INT CON POS: NORMAL
S PYO AG THROAT QL: NEGATIVE
SARS-COV+SARS-COV-2 AG RESP QL IA.RAPID: NEGATIVE

## 2023-01-03 PROCEDURE — 87880 STREP A ASSAY W/OPTIC: CPT | Performed by: PHYSICIAN ASSISTANT

## 2023-01-03 PROCEDURE — 87426 SARSCOV CORONAVIRUS AG IA: CPT | Performed by: PHYSICIAN ASSISTANT

## 2023-01-03 PROCEDURE — 99203 OFFICE O/P NEW LOW 30 MIN: CPT | Performed by: PHYSICIAN ASSISTANT

## 2023-01-03 PROCEDURE — 87804 INFLUENZA ASSAY W/OPTIC: CPT | Performed by: PHYSICIAN ASSISTANT

## 2023-01-03 RX ORDER — BUPROPION HYDROCHLORIDE 100 MG/1
TABLET, EXTENDED RELEASE ORAL
COMMUNITY
Start: 2022-12-23

## 2023-01-03 ASSESSMENT — ENCOUNTER SYMPTOMS
SPUTUM PRODUCTION: 0
WHEEZING: 0
ABDOMINAL PAIN: 0
FEVER: 0
DIARRHEA: 0
HEADACHES: 1
VOMITING: 0
SHORTNESS OF BREATH: 0
NAUSEA: 0
SORE THROAT: 1
COUGH: 1
SINUS PAIN: 1
MYALGIAS: 1
CHILLS: 1

## 2023-01-03 NOTE — PROGRESS NOTES
Subjective:     Joan Jesus  is a 22 y.o. female who presents for Body Aches, Neck Pain, Headache, and Sinus Pain      Neck Pain   Associated symptoms include headaches. Pertinent negatives include no fever.   Headache  Sinus Pain  Associated symptoms include chills, coughing, headaches, myalgias and a sore throat. Pertinent negatives include no abdominal pain, fever, nausea, rash or vomiting.     Patient presents urgent care complaining symptoms onset this morning.  Complains of chills and body aches.  Patient did take ibuprofen around 11 today.  Notes cough as well as sore throat.  States sore throat awoke her from sleep last night and has been with her through the day.  Cough is relatively dry with some wheezing.  Patient with a history of alpha 1 antitrypsin deficiency.  Reports continued use of medications for opening airways.  Notes slight increase in pleuritic pain with deep breathing.  She complains of headache and sinus congestion.  Denies vomiting abdominal pain or diarrhea.  Patient denies specific sick contacts.  Does have a history of pneumonia most recently more than 7 years ago.  Additionally patient has had COVID twice.    Review of Systems   Constitutional:  Positive for chills. Negative for fever.   HENT:  Positive for sinus pain and sore throat. Negative for ear pain.    Respiratory:  Positive for cough. Negative for sputum production, shortness of breath and wheezing.    Gastrointestinal:  Negative for abdominal pain, diarrhea, nausea and vomiting.   Musculoskeletal:  Positive for myalgias.   Skin:  Negative for rash.   Neurological:  Positive for headaches.     Medications:    alpha-1-proteinase inhibitor Solr  budesonide-formoterol Aero  buPROPion SR Tb12  cetirizine Tabs  EPINEPHrine Soaj  etonogestrel Impl  ipratropium Aers  levalbuterol Nebu  methylPREDNISolone Tbpk  montelukast Tabs    Allergies: Iodine, Sulfa drugs, Fentanyl, Cefuroxime, Clindamycin, Gluten meal, Morphine, Other  misc, Pcn [penicillins], Prednisone, Vancomycin, and Zyvox [linezolid]    Problem List: Joan Jesus does not have any pertinent problems on file.    Surgical History:  Past Surgical History:   Procedure Laterality Date    TONSILLECTOMY AND ADENOIDECTOMY N/A 5/25/2018    Procedure: TONSILLECTOMY AND ADENOIDECTOMY;  Surgeon: Luiza Callahan M.D.;  Location: SURGERY [facility];  Service: Ent    ANTROSTOMY Bilateral 3/25/2016    Procedure: ANTROSTOMY - ENDOSCOPIC MAXILLARY W/FRONTAL SINUS EXPLORATION;  Surgeon: Luiza Callahan M.D.;  Location: SURGERY [facility];  Service:     ETHMOIDECTOMY Bilateral 3/25/2016    Procedure: ETHMOIDECTOMY - ENDOSCOPIC ;  Surgeon: Luiza Callahan M.D.;  Location: [facility];  Service:     SPHENOIDECTOMY Bilateral 3/25/2016    Procedure: SPHENOIDOTOMY;  Surgeon: Luiza Callahan M.D.;  Location: SURGERY [facility];  Service:     TURBINOPLASTY Bilateral 3/25/2016    Procedure: TURBINOPLASTY;  Surgeon: Luiza Callahan M.D.;  Location: SURGERY [facility];  Service:     APPENDECTOMY CHILD  2013    INGUINAL HERNIA REPAIR  2004    OTHER      Alpha 1 Anti tripson deficiency       Past Social Hx: Joan Jesus  reports that she has never smoked. She has never used smokeless tobacco. She reports that she does not drink alcohol and does not use drugs.     Past Family Hx:  Joan Jesus family history includes Asthma in her father; Hypertension in her mother; Other in her mother.     Problem list, medications, and allergies reviewed by myself today in Epic.     Objective:   /68   Pulse (!) 116   Temp 37.4 °C (99.3 °F) (Temporal)   Resp 12   Wt 58.5 kg (129 lb)   SpO2 97%   BMI 25.19 kg/m²     Physical Exam  Vitals and nursing note reviewed.   Constitutional:       General: She is not in acute distress.     Appearance: She is well-developed. She is not diaphoretic.   HENT:      Head: Normocephalic and atraumatic.       Right Ear: Tympanic membrane, ear canal and external ear normal.      Left Ear: Tympanic membrane, ear canal and external ear normal.      Nose: Nose normal.      Mouth/Throat:      Mouth: Mucous membranes are moist.      Pharynx: Uvula midline. Posterior oropharyngeal erythema (mild PND;deeper) present. No oropharyngeal exudate.      Tonsils: No tonsillar abscesses.   Eyes:      General: Lids are normal. No scleral icterus.        Right eye: No discharge.         Left eye: No discharge.      Conjunctiva/sclera: Conjunctivae normal.   Pulmonary:      Effort: Pulmonary effort is normal. No respiratory distress.      Breath sounds: Normal breath sounds. No stridor. No decreased breath sounds, wheezing, rhonchi or rales.   Musculoskeletal:         General: Normal range of motion.      Cervical back: Neck supple.   Skin:     General: Skin is warm and dry.      Coloration: Skin is not pale.      Findings: No erythema.   Neurological:      Mental Status: She is alert and oriented to person, place, and time. She is not disoriented.   Psychiatric:         Speech: Speech normal.         Behavior: Behavior normal.   Point-of-care testing for COVID strep and influenza are all negative    Assessment/Plan:   Assessment      1. Viral URI    2. Sinus congestion  - POCT Rapid Strep A  - POCT SARS-COV Antigen JOSELITO Manual Result  - POCT Influenza A/B    Other orders  - buPROPion SR (WELLBUTRIN-SR) 100 MG TABLET SR 12 HR  Supportive care is reviewed with patient/caregiver - recommend to push PO fluids and electrolytes, continue home medications, recommend repeat home testing for COVID-19 in 48 hours to ensure continued negative  Return to clinic with lack of resolution or progression of symptoms.  ER precautions with any worsening symptoms are reviewed with patient/caregiver and they do express understanding        I have worn an N95 mask, gloves and eye protection for the entire encounter with this patient.     Differential  diagnosis, natural history, supportive care, and indications for immediate follow-up discussed.

## 2023-03-14 ENCOUNTER — OFFICE VISIT (OUTPATIENT)
Dept: URGENT CARE | Facility: CLINIC | Age: 23
End: 2023-03-14
Payer: COMMERCIAL

## 2023-03-14 VITALS
BODY MASS INDEX: 25.52 KG/M2 | SYSTOLIC BLOOD PRESSURE: 110 MMHG | RESPIRATION RATE: 16 BRPM | OXYGEN SATURATION: 97 % | WEIGHT: 130 LBS | HEIGHT: 60 IN | HEART RATE: 112 BPM | TEMPERATURE: 97.5 F | DIASTOLIC BLOOD PRESSURE: 76 MMHG

## 2023-03-14 DIAGNOSIS — J01.01 ACUTE RECURRENT MAXILLARY SINUSITIS: ICD-10-CM

## 2023-03-14 PROCEDURE — 99214 OFFICE O/P EST MOD 30 MIN: CPT | Performed by: NURSE PRACTITIONER

## 2023-03-14 RX ORDER — AZITHROMYCIN 250 MG/1
TABLET, FILM COATED ORAL
Qty: 6 TABLET | Refills: 0 | Status: SHIPPED | OUTPATIENT
Start: 2023-03-14

## 2023-03-14 RX ORDER — METHYLPREDNISOLONE 4 MG/1
4 TABLET ORAL DAILY
Qty: 21 EACH | Refills: 0 | Status: SHIPPED | OUTPATIENT
Start: 2023-03-14

## 2023-03-14 NOTE — PROGRESS NOTES
Chief Complaint   Patient presents with    Sinusitis       HISTORY OF PRESENT ILLNESS: Patient is a pleasant 23 y.o. female who presents today due to two days of nasal congestion, chills, headache, and sinus pressure. She denies associated cough, difficulty breathing, confusion, nausea, vomiting or diarrhea. She has tried OTC cold/sinus medication at home without much improvement.  She admits to history of recurrent sinus infections, is scheduled to see ENT at the end of this month for sinus surgery.  She has had 2 rounds of doxycycline and a round of Biaxin for treatment without full resolve for recurrent infection since January.  She completed Biaxin 3 weeks ago.      Patient Active Problem List    Diagnosis Date Noted    Facial cellulitis 02/07/2019    Chronic pansinusitis 03/25/2016    Asthma, moderate persistent 12/18/2015       Allergies:Iodine, Sulfa drugs, Fentanyl, Amitriptyline, Cefuroxime, Clindamycin, Duloxetine, Gluten meal, Morphine, Other misc, Pcn [penicillins], Prednisone, Vancomycin, and Zyvox [linezolid]    Current Outpatient Medications Ordered in Epic   Medication Sig Dispense Refill    methylPREDNISolone (MEDROL DOSEPAK) 4 MG Tablet Therapy Pack Take 1 Tablet by mouth every day. Take with food. 21 Each 0    azithromycin (ZITHROMAX) 250 MG Tab Take two tabs on day one followed by one tab on days 2-5. 6 Tablet 0    buPROPion SR (WELLBUTRIN-SR) 100 MG TABLET SR 12 HR       EPINEPHrine (EPIPEN 2-MALLORY) 0.3 MG/0.3ML Solution Auto-injector solution for injection 0.3 mL by Intramuscular route Once PRN (Severe allergic reaction) for up to 1 dose. 1 Each 3    ipratropium (ATROVENT) 17 MCG/ACT Aero Soln Inhale 2 Puffs by mouth every 6 hours as needed (SOB).      alpha-1-proteinase inhibitor (PROLASTIN) 500 MG Recon Soln 4,880 mg by Intravenous route every 7 days. Every Wednesday or Friday, depending on preference of Pt      etonogestrel (NEXPLANON) 68 MG Implant implant Inject 1 Each as instructed Once.    "   budesonide-formoterol (SYMBICORT) 160-4.5 MCG/ACT Aerosol Inhale 2 Puffs by mouth 2 Times a Day.      montelukast (SINGULAIR) 10 MG Tab Take 10 mg by mouth every evening.      cetirizine (ZYRTEC) 10 MG Tab Take 10 mg by mouth 2 Times a Day.      levalbuterol (XOPENEX) 1.25 MG/3ML Nebu Soln 1.25 mg by Nebulization route every four hours as needed for Shortness of Breath.       No current [facility]-ordered facility-administered medications on file.       Past Medical History:   Diagnosis Date    Alpha-1-antitrypsin deficiency (HCC)     Asthma     Dental disorder     permanent retainer lower     Food allergy     \"jalepenos make my lips and face swell\"    Gynecological disorder     ovarian cysts    Heart burn     resolved    Hypoglycemia     Seasonal allergies        Social History     Tobacco Use    Smoking status: Never    Smokeless tobacco: Never   Vaping Use    Vaping Use: Never used   Substance Use Topics    Alcohol use: No     Comment: Alpha 1 Anti tripson deficiency    Drug use: No       Family Status   Relation Name Status    Mo  Alive    Fa  Alive     Family History   Problem Relation Age of Onset    Hypertension Mother     Other Mother     Asthma Father        ROS:  Review of Systems   Constitutional: Positive for malaise, chills, fatigue. Negative for weight loss.   HENT: Positive for sinus pressure, sore throat, nasal congestion. Negative for ear pain, nosebleeds, neck pain.    Eyes: Negative for vision changes.   Neuro: Positive for headache. Negative for sensory changes, weakness, seizure, LOC.  Cardiovascular: Negative for chest pain, palpitations, orthopnea and leg swelling.   Respiratory: Negative for cough, sputum production, shortness of breath and wheezing.   Gastrointestinal: Negative for abdominal pain, nausea, vomiting or diarrhea.    Skin: Negative for rash, diaphoresis.     Exam:  /76 (BP Location: Right arm, Patient Position: Sitting, BP Cuff Size: Adult long)   Pulse (!) 112   Temp 36.4 " °C (97.5 °F) (Temporal)   Resp 16   Ht 1.524 m (5')   Wt 59 kg (130 lb)   SpO2 97%   General: well-nourished, well-developed female in NAD  Head: normocephalic, atraumatic  Eyes: PERRLA, no conjunctival injection, acuity grossly intact, lids normal.  Ears: normal shape and symmetry, no tenderness, no discharge. External canals are without any significant edema or erythema. Tympanic membranes are without any inflammation, no effusion. Gross auditory acuity is intact.  Nose: symmetrical without tenderness, erythema and swelling noted bilateral turbinates, clear discharge.  Bilateral maxillary sinus tenderness.   Mouth/Throat: reasonable hygiene, no exudates or tonsillar enlargement. Erythema is present.   Neck: no masses, range of motion within normal limits, no tracheal deviation. No obvious thyroid enlargement.   Lymph: no cervical adenopathy. No supraclavicular adenopathy.   Neuro: alert and oriented. Cranial nerves 1-12 grossly intact. No sensory deficit.   Cardiovascular: regular rate and rhythm. No edema.  Pulmonary: no distress. Chest is symmetrical with respiration, no wheezes, crackles, or rhonchi.   Musculoskeletal: no clubbing, appropriate muscle tone, gait is stable.  Skin: warm, dry, intact, no clubbing, no cyanosis, no rashes.   Psych: appropriate mood, affect, judgement.         Assessment/Plan:  1. Acute recurrent maxillary sinusitis  methylPREDNISolone (MEDROL DOSEPAK) 4 MG Tablet Therapy Pack    azithromycin (ZITHROMAX) 250 MG Tab            Medrol and azithromycin as directed, potential side effects of medication discussed.   Sleep with HOB elevated, humidifier at night, rest, increase fluid intake.   Supportive care, differential diagnoses, and indications for immediate follow-up discussed with patient.   Pathogenesis of diagnosis discussed including typical length and natural progression.   Instructed to return to clinic or nearest emergency department for any change in condition, further  concerns, or worsening of symptoms.  Patient states understanding of the plan of care and discharge instructions.  Instructed to make an appointment, for follow up, with ENT.      Please note that this dictation was created using voice recognition software. I have made every reasonable attempt to correct obvious errors, but I expect that there are errors of grammar and possibly content that I did not discover before finalizing the note. N95 and safety glasses used for entire visit.       DOUG Goldberg.

## 2024-04-09 ENCOUNTER — APPOINTMENT (OUTPATIENT)
Dept: RADIOLOGY | Facility: MEDICAL CENTER | Age: 24
End: 2024-04-09
Attending: NURSE PRACTITIONER
Payer: COMMERCIAL

## 2024-04-16 ENCOUNTER — HOSPITAL ENCOUNTER (OUTPATIENT)
Dept: RADIOLOGY | Facility: MEDICAL CENTER | Age: 24
End: 2024-04-16
Attending: NURSE PRACTITIONER
Payer: COMMERCIAL

## 2024-04-16 DIAGNOSIS — R13.19 ESOPHAGEAL DYSPHAGIA: ICD-10-CM

## 2024-04-16 DIAGNOSIS — R13.10 ODYNOPHAGIA: ICD-10-CM

## 2024-04-16 DIAGNOSIS — R68.81 EARLY SATIETY: ICD-10-CM

## 2024-04-16 DIAGNOSIS — R12 HEARTBURN: ICD-10-CM

## 2024-04-16 DIAGNOSIS — E88.01 ALPHA-1-ANTITRYPSIN DEFICIENCY (HCC): ICD-10-CM

## 2024-04-16 DIAGNOSIS — K59.00 CONSTIPATION, UNSPECIFIED CONSTIPATION TYPE: ICD-10-CM

## 2024-04-16 DIAGNOSIS — R12 BURNING REFLUX: ICD-10-CM

## 2024-04-16 DIAGNOSIS — R11.0 NAUSEA: ICD-10-CM

## 2024-04-16 DIAGNOSIS — R09.82 POST-NASAL DRIP: ICD-10-CM

## 2024-04-16 DIAGNOSIS — K90.41 GLUTEN INTOLERANCE: ICD-10-CM

## 2024-04-16 DIAGNOSIS — R63.4 LOSS OF WEIGHT: ICD-10-CM

## 2024-04-16 PROCEDURE — A9541 TC99M SULFUR COLLOID: HCPCS

## 2024-04-19 ENCOUNTER — APPOINTMENT (OUTPATIENT)
Dept: RADIOLOGY | Facility: MEDICAL CENTER | Age: 24
End: 2024-04-19
Attending: NURSE PRACTITIONER
Payer: COMMERCIAL

## 2024-04-26 ENCOUNTER — APPOINTMENT (OUTPATIENT)
Dept: RADIOLOGY | Facility: MEDICAL CENTER | Age: 24
End: 2024-04-26
Attending: NURSE PRACTITIONER
Payer: COMMERCIAL

## 2024-05-15 ENCOUNTER — HOSPITAL ENCOUNTER (OUTPATIENT)
Dept: RADIOLOGY | Facility: MEDICAL CENTER | Age: 24
End: 2024-05-15
Attending: NURSE PRACTITIONER
Payer: COMMERCIAL

## 2024-05-15 DIAGNOSIS — E88.01 ALPHA-1-ANTITRYPSIN DEFICIENCY (HCC): ICD-10-CM

## 2024-05-15 DIAGNOSIS — R13.19 ESOPHAGEAL DYSPHAGIA: ICD-10-CM

## 2024-05-15 DIAGNOSIS — K90.41 GLUTEN INTOLERANCE: ICD-10-CM

## 2024-05-15 DIAGNOSIS — R13.10 ODYNOPHAGIA: ICD-10-CM

## 2024-05-15 DIAGNOSIS — R68.81 EARLY SATIETY: ICD-10-CM

## 2024-05-15 DIAGNOSIS — R11.0 NAUSEA: ICD-10-CM

## 2024-05-15 DIAGNOSIS — R12 BURNING REFLUX: ICD-10-CM

## 2024-05-15 DIAGNOSIS — R12 HEARTBURN: ICD-10-CM

## 2024-05-15 DIAGNOSIS — R09.82 POST-NASAL DRIP: ICD-10-CM

## 2024-05-15 DIAGNOSIS — K59.00 CONSTIPATION, UNSPECIFIED CONSTIPATION TYPE: ICD-10-CM

## 2024-05-15 DIAGNOSIS — R63.4 LOSS OF WEIGHT: ICD-10-CM

## 2024-05-15 RX ADMIN — BARIUM SULFATE 700 MG: 700 TABLET ORAL at 13:44

## 2024-05-18 ENCOUNTER — HOSPITAL ENCOUNTER (OUTPATIENT)
Dept: LAB | Facility: MEDICAL CENTER | Age: 24
End: 2024-05-18
Attending: INTERNAL MEDICINE
Payer: COMMERCIAL

## 2024-05-18 LAB
CRP SERPL HS-MCNC: <0.3 MG/DL (ref 0–0.75)
ERYTHROCYTE [SEDIMENTATION RATE] IN BLOOD BY WESTERGREN METHOD: 6 MM/HOUR (ref 0–25)

## 2024-05-20 LAB
IGE SERPL-ACNC: 97 KU/L
TRYPTASE SERPL-MCNC: 7.2 UG/L

## 2024-05-23 LAB — HISTAMINE SERPL-SCNC: <8 NMOL/L (ref 0–8)

## 2024-07-16 ENCOUNTER — APPOINTMENT (OUTPATIENT)
Dept: RADIOLOGY | Facility: MEDICAL CENTER | Age: 24
End: 2024-07-16
Attending: NURSE PRACTITIONER
Payer: COMMERCIAL

## 2024-07-30 ENCOUNTER — HOSPITAL ENCOUNTER (OUTPATIENT)
Dept: RADIOLOGY | Facility: MEDICAL CENTER | Age: 24
End: 2024-07-30
Attending: NURSE PRACTITIONER
Payer: COMMERCIAL

## 2024-07-30 DIAGNOSIS — R13.19 ESOPHAGEAL DYSPHAGIA: ICD-10-CM

## 2024-07-30 DIAGNOSIS — R12 BURNING REFLUX: ICD-10-CM

## 2024-07-30 DIAGNOSIS — R13.10 ODYNOPHAGIA: ICD-10-CM

## 2024-07-30 DIAGNOSIS — K90.41 GLUTEN INTOLERANCE: ICD-10-CM

## 2024-07-30 DIAGNOSIS — R68.81 EARLY SATIETY: ICD-10-CM

## 2024-07-30 DIAGNOSIS — K59.00 CONSTIPATION, UNSPECIFIED CONSTIPATION TYPE: ICD-10-CM

## 2024-07-30 DIAGNOSIS — R12 HEARTBURN: ICD-10-CM

## 2024-07-30 DIAGNOSIS — R63.4 LOSS OF WEIGHT: ICD-10-CM

## 2024-07-30 DIAGNOSIS — R11.0 NAUSEA: ICD-10-CM

## 2024-07-30 DIAGNOSIS — R09.82 POST-NASAL DRIP: ICD-10-CM

## 2024-07-30 DIAGNOSIS — E88.01 ALPHA-1-ANTITRYPSIN DEFICIENCY (HCC): ICD-10-CM

## 2024-07-30 PROCEDURE — A9541 TC99M SULFUR COLLOID: HCPCS

## 2024-08-17 ENCOUNTER — APPOINTMENT (OUTPATIENT)
Dept: RADIOLOGY | Facility: MEDICAL CENTER | Age: 24
End: 2024-08-17
Attending: OTOLARYNGOLOGY
Payer: COMMERCIAL

## 2024-09-16 ENCOUNTER — HOSPITAL ENCOUNTER (OUTPATIENT)
Dept: RADIOLOGY | Facility: MEDICAL CENTER | Age: 24
End: 2024-09-16
Attending: OTOLARYNGOLOGY
Payer: COMMERCIAL

## 2024-09-16 DIAGNOSIS — I88.1 CHRONIC LYMPHADENITIS, EXCEPT MESENTERIC: ICD-10-CM

## 2024-09-16 PROCEDURE — 70543 MRI ORBT/FAC/NCK W/O &W/DYE: CPT

## 2024-09-16 PROCEDURE — 72141 MRI NECK SPINE W/O DYE: CPT

## 2024-09-16 NOTE — PROGRESS NOTES
Patient here for soft tissue neck MRI; ordered with and without contrast.  Pt has braces, causing significant artifact on images.  I spoke with Radiologist Dr. Greer regarding this; per radiologist; pt should not get contrast as images are obscured by the pt's braces.  Dr. Greer stated a CT or US would be a better diagnostic tool for pt's condition and braces (pt having scan for swollen lymph nodes).  Pt informed. MRI images without contrast sent for reading.

## (undated) DEVICE — ELECTRODE DUAL RETURN W/ CORD - (50/PK)

## (undated) DEVICE — TUBE E-T HI-LO CUFF 6.5MM (10EA/BX)

## (undated) DEVICE — PACK MINOR BASIN - (2EA/CA)

## (undated) DEVICE — ELECTRODE 850 FOAM ADHESIVE - HYDROGEL RADIOTRNSPRNT (50/PK)

## (undated) DEVICE — BOVIE BLADE COATED &INSULATED - 25/PK

## (undated) DEVICE — GLOVE SURGICAL PROTEXIS PI 8.0 LF - (50PR/BX)

## (undated) DEVICE — GLOVE BIOGEL ECLIPSE PF LATEX SIZE 8.0  (50PR/BX)

## (undated) DEVICE — SENSOR SPO2 NEO LNCS ADHESIVE (20/BX) SEE USER NOTES

## (undated) DEVICE — SODIUM CHL IRRIGATION 0.9% 1000ML (12EA/CA)

## (undated) DEVICE — KIT ROOM DECONTAMINATION

## (undated) DEVICE — KIT ANESTHESIA W/CIRCUIT & 3/LT BAG W/FILTER (20EA/CA)

## (undated) DEVICE — SYRINGE 10 ML CONTROL LL (25EA/BX 4BX/CA)

## (undated) DEVICE — PROTECTOR ULNA NERVE - (36PR/CA)

## (undated) DEVICE — NEPTUNE 4 PORT MANIFOLD - (20/PK)

## (undated) DEVICE — SPONGE TONSIL LARGE XRAY STERILE - (5/PK 20PK/CA)

## (undated) DEVICE — LACTATED RINGERS INJ 1000 ML - (14EA/CA 60CA/PF)

## (undated) DEVICE — GLOVE, LITE (PAIR)

## (undated) DEVICE — CANISTER SUCTION RIGID RED 1500CC (40EA/CA)

## (undated) DEVICE — MASK ANESTHESIA ADULT  - (100/CA)

## (undated) DEVICE — GOWN WARMING STANDARD FLEX - (30/CA)

## (undated) DEVICE — TUBE CONNECTING SUCTION - CLEAR PLASTIC STERILE 72 IN (50EA/CA)

## (undated) DEVICE — SUCTION INSTRUMENT YANKAUER BULBOUS TIP W/O VENT (50EA/CA)

## (undated) DEVICE — NEEDLE NON SAFETY 27GA X 1-1/4 IN HYPO (100EA/BX)

## (undated) DEVICE — CIRCUIT VENTILATOR PEDIATRIC WITH FILTER  (20EA/CS)

## (undated) DEVICE — BOVIE FOOT CONTROL SUCTION - 6IN 10FR (25EA/CA)

## (undated) DEVICE — DRAPE LARGE 3 QUARTER - (20/CA)

## (undated) DEVICE — HEAD HOLDER JUNIOR/ADULT

## (undated) DEVICE — ANTI-FOG SOLUTION - 60BTL/CA